# Patient Record
Sex: MALE | Race: WHITE | NOT HISPANIC OR LATINO | Employment: OTHER | URBAN - METROPOLITAN AREA
[De-identification: names, ages, dates, MRNs, and addresses within clinical notes are randomized per-mention and may not be internally consistent; named-entity substitution may affect disease eponyms.]

---

## 2023-01-10 ENCOUNTER — HOSPITAL ENCOUNTER (EMERGENCY)
Facility: HOSPITAL | Age: 28
Discharge: HOME | End: 2023-01-11
Attending: EMERGENCY MEDICINE
Payer: MEDICAID

## 2023-01-10 DIAGNOSIS — M25.552 CHRONIC LEFT HIP PAIN: Primary | ICD-10-CM

## 2023-01-10 DIAGNOSIS — M25.559 HIP PAIN: ICD-10-CM

## 2023-01-10 DIAGNOSIS — G89.29 CHRONIC LEFT HIP PAIN: Primary | ICD-10-CM

## 2023-01-10 DIAGNOSIS — M25.569 KNEE PAIN: ICD-10-CM

## 2023-01-10 PROCEDURE — 99284 EMERGENCY DEPT VISIT MOD MDM: CPT | Mod: ,,, | Performed by: EMERGENCY MEDICINE

## 2023-01-10 PROCEDURE — 99284 PR EMERGENCY DEPT VISIT,LEVEL IV: ICD-10-PCS | Mod: ,,, | Performed by: EMERGENCY MEDICINE

## 2023-01-10 PROCEDURE — 99284 EMERGENCY DEPT VISIT MOD MDM: CPT | Mod: 25

## 2023-01-11 VITALS
RESPIRATION RATE: 20 BRPM | HEART RATE: 84 BPM | OXYGEN SATURATION: 99 % | SYSTOLIC BLOOD PRESSURE: 102 MMHG | TEMPERATURE: 98 F | DIASTOLIC BLOOD PRESSURE: 52 MMHG

## 2023-01-11 NOTE — ED PROVIDER NOTES
"Source of History:  Patient  Chart    Chief complaint:  Hip Pain (L hip pain. Thinks his hip is dislocated. Reports "knee is not working as well." ROM decreased. N/V intact. )      HPI:  Robyn Clifford is a 27 y.o. male with history of neurologic Behcet's syndrome presenting to emergency department with complaint of left hip pain.    Per chart review, in 2020, rheumatology note states that patient has symptoms include urinary incontinence, bowel incontinence, sexual dysfunction, leg numbness, headache, left upper extremity weakness, lower extremity weakness, and back and hip pain.    Patient was evaluated in a bed.  He was laying on the bed, turned away from me, and would not sit up, open his eyes to engage in conversation.  He states that he has had several months of left hip pain.  He denies injury.  He thinks it is dislocated.  He will not give any other history.    Review of patient's allergies indicates:  No Known Allergies    No current facility-administered medications on file prior to encounter.     No current outpatient medications on file prior to encounter.       PMH:  As per HPI and below:  History reviewed. No pertinent past medical history.  History reviewed. No pertinent surgical history.    Social History     Socioeconomic History    Marital status: Single   Tobacco Use    Smoking status: Unknown       History reviewed. No pertinent family history.    Physical Exam:      Vitals:    01/11/23 0428   BP: (!) 102/52   Pulse: 84   Resp: 20   Temp: 98 °F (36.7 °C)     Gen: No acute distress. Nontoxic.  Thin, chronically ill-appearing.  Mental Status:  Alert and oriented x 3.  Appropriate, conversant.  Skin: Warm, dry. No rashes seen.   Pulm: No increased work of breathing.  CV: Normal peripheral perfusion.  Left lower extremity warm and well perfused  MSK:  I am able to passively range the left hip and knee.  No focal tenderness.  No deformity.  No crepitus.  Compartments are soft.  There is muscle wasting " and contracture present.  Neuro:  Somnolent but arousable.    Laboratory Studies:  Labs Reviewed - No data to display    X-rays (independently interpreted by me):  No fracture or dislocation    Chart reviewed.     Imaging Results              X-Ray Femur AP/LAT Left (Final result)  Result time 01/11/23 03:28:35      Final result by Ishmael Santana MD (01/11/23 03:28:35)                   Impression:      No acute fracture or dislocation identified noting limited assessment of the left knee due to suboptimal positioning.      Electronically signed by: Ishmael Santana MD  Date:    01/11/2023  Time:    03:28               Narrative:    EXAMINATION:  XR HIP WITH PELVIS WHEN PERFORMED, 2 OR 3 VIEWS LEFT; XR FEMUR 2 VIEW LEFT; XR KNEE 1 OR 2 VIEW LEFT    CLINICAL HISTORY:  knee pain ; Pain in unspecified hip; Pain in unspecified knee    TECHNIQUE:  AP view of the pelvis and frog leg lateral view of the left hip were performed.  Left femur two views and left knee three views were obtained.  Views of the left knee were obtained with the knee in flexion.    COMPARISON:  None    FINDINGS:  No fractures or dislocations.  Unremarkable visualized bony structures. Limited assessment of the left knee with views obtained in flexion.  Tibial plateau not well demonstrated.  No displaced fracture seen.                                       X-Ray Knee 1 or 2 View Left (Final result)  Result time 01/11/23 03:28:35   Procedure changed from X-Ray Knee 3 View Left     Final result by Ishmael Santana MD (01/11/23 03:28:35)                   Impression:      No acute fracture or dislocation identified noting limited assessment of the left knee due to suboptimal positioning.      Electronically signed by: Ishmael Santana MD  Date:    01/11/2023  Time:    03:28               Narrative:    EXAMINATION:  XR HIP WITH PELVIS WHEN PERFORMED, 2 OR 3 VIEWS LEFT; XR FEMUR 2 VIEW LEFT; XR KNEE 1 OR 2 VIEW LEFT    CLINICAL HISTORY:  knee pain ;  Pain in unspecified hip; Pain in unspecified knee    TECHNIQUE:  AP view of the pelvis and frog leg lateral view of the left hip were performed.  Left femur two views and left knee three views were obtained.  Views of the left knee were obtained with the knee in flexion.    COMPARISON:  None    FINDINGS:  No fractures or dislocations.  Unremarkable visualized bony structures. Limited assessment of the left knee with views obtained in flexion.  Tibial plateau not well demonstrated.  No displaced fracture seen.                                       X-Ray Hip 2 or 3 views Left (with Pelvis when performed) (Final result)  Result time 01/11/23 03:28:35      Final result by Ishmael Santana MD (01/11/23 03:28:35)                   Impression:      No acute fracture or dislocation identified noting limited assessment of the left knee due to suboptimal positioning.      Electronically signed by: Ishmael Santana MD  Date:    01/11/2023  Time:    03:28               Narrative:    EXAMINATION:  XR HIP WITH PELVIS WHEN PERFORMED, 2 OR 3 VIEWS LEFT; XR FEMUR 2 VIEW LEFT; XR KNEE 1 OR 2 VIEW LEFT    CLINICAL HISTORY:  knee pain ; Pain in unspecified hip; Pain in unspecified knee    TECHNIQUE:  AP view of the pelvis and frog leg lateral view of the left hip were performed.  Left femur two views and left knee three views were obtained.  Views of the left knee were obtained with the knee in flexion.    COMPARISON:  None    FINDINGS:  No fractures or dislocations.  Unremarkable visualized bony structures. Limited assessment of the left knee with views obtained in flexion.  Tibial plateau not well demonstrated.  No displaced fracture seen.                                      Medications Given:  Medications - No data to display    MDM:    27 y.o. male with complaint of chronic left hip pain.  Patient states he was concerned it was dislocated, although there has been no recent injury and he can range it.  Patient is laying on the  clarence during my assessment, will not turn to face me, will not open his eyes to engage in conversation.      X-rays are negative.  No emergent medical condition identified.  Discharged home.    Diagnostic Impression:    1. Chronic left hip pain    2. Knee pain    3. Hip pain         ED Disposition Condition    Discharge Stable          ED Prescriptions    None       Follow-up Information       Follow up With Specialties Details Why Contact Info    Your primary care doctor  Schedule an appointment as soon as possible for a visit               Dian Schwartz MD  Emergency Medicine         Dian Schwartz MD  01/15/23 0244

## 2023-01-11 NOTE — DISCHARGE INSTRUCTIONS
You can take Tylenol or ibuprofen for your pain.  Follow-up with your doctor.  X-rays did not show broken bones or a dislocation.    Tests today showed:   Labs Reviewed - No data to display  X-Ray Femur AP/LAT Left   Final Result      No acute fracture or dislocation identified noting limited assessment of the left knee due to suboptimal positioning.         Electronically signed by: Ishmael Santana MD   Date:    01/11/2023   Time:    03:28      X-Ray Knee 1 or 2 View Left   Final Result      No acute fracture or dislocation identified noting limited assessment of the left knee due to suboptimal positioning.         Electronically signed by: Ishmael Santana MD   Date:    01/11/2023   Time:    03:28      X-Ray Hip 2 or 3 views Left (with Pelvis when performed)   Final Result      No acute fracture or dislocation identified noting limited assessment of the left knee due to suboptimal positioning.         Electronically signed by: Ishmael Santana MD   Date:    01/11/2023   Time:    03:28          Treatments you had today:   Medications - No data to display    Follow-Up Plan:  - Follow-up with primary care doctor within 3 - 5 days  - Additional testing and/or evaluation as directed by your primary doctor    Return to the Emergency Department for symptoms including but not limited to: worsening symptoms, shortness of breath or chest pain, vomiting with inability to hold down fluids, fevers greater than 100.4°F, passing out/fainting/unconsciousness, or other concerning symptoms.

## 2023-01-11 NOTE — ED TRIAGE NOTES
"Robyn Clifford, a 27 y.o. male presents to the ED w/ complaint of hip pain. L hip pain, thinks he dislocated it. Reports knee "isnt working that well"    Adult Physical Assessment  LOC: Robyn Clifford, 27 y.o. male verified via two identifiers.  The patient is awake, alert, oriented and speaking appropriately at this time.  APPEARANCE: Patient resting comfortably and appears to be in no acute distress at this time. Patient is clean and well groomed, patient's clothing is properly fastened.  SKIN:The skin is warm and dry, color consistent with ethnicity, patient has normal skin turgor and moist mucus membranes, skin intact, no breakdown or brusing noted.  MUSCULOSKELETAL: Patient moving all extremities well, no obvious swelling or deformities noted.C/o L hip pain, thinks he dislocated it.   RESPIRATORY: Airway is open and patent, respirations are spontaneous, patient has a normal effort and rate, no accessory muscle use noted.  CARDIAC: Patient has a normal rate and rhythm, no periphreal edema noted in any extremity, capillary refill < 3 seconds in all extremities  ABDOMEN: Soft and non tender to palpation, no abdominal distention noted. Bowel sounds present in all four quadrants.  NEUROLOGIC: Eyes open spontaneously, behavior appropriate to situation, follows commands, facial expression symmetrical, bilateral hand grasp equal and even, purposeful motor response noted, normal sensation in all extremities when touched with a finger.      Triage note:  Chief Complaint   Patient presents with    Hip Pain     L hip pain. Thinks his hip is dislocated. Reports "knee is not working as well." ROM decreased. N/V intact.      Review of patient's allergies indicates:  No Known Allergies  No past medical history on file.    "

## 2023-02-06 ENCOUNTER — OFFICE VISIT (OUTPATIENT)
Dept: PRIMARY CARE CLINIC | Facility: CLINIC | Age: 28
End: 2023-02-06
Payer: MEDICAID

## 2023-02-06 VITALS
BODY MASS INDEX: 19.02 KG/M2 | WEIGHT: 140.44 LBS | SYSTOLIC BLOOD PRESSURE: 110 MMHG | HEART RATE: 123 BPM | HEIGHT: 72 IN | OXYGEN SATURATION: 99 % | DIASTOLIC BLOOD PRESSURE: 71 MMHG

## 2023-02-06 DIAGNOSIS — M25.50 ARTHRALGIA, UNSPECIFIED JOINT: ICD-10-CM

## 2023-02-06 DIAGNOSIS — M25.559 PAIN IN UNSPECIFIED HIP: ICD-10-CM

## 2023-02-06 DIAGNOSIS — Z00.00 ANNUAL PHYSICAL EXAM: ICD-10-CM

## 2023-02-06 DIAGNOSIS — M25.552 LEFT HIP PAIN: Primary | ICD-10-CM

## 2023-02-06 DIAGNOSIS — M35.2 BEHCET'S DISEASE: ICD-10-CM

## 2023-02-06 PROBLEM — N52.9 ED (ERECTILE DYSFUNCTION) OF ORGANIC ORIGIN: Status: ACTIVE | Noted: 2023-02-06
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
## 2023-02-06 PROBLEM — R30.0 DIFFICULT OR PAINFUL URINATION: Status: ACTIVE | Noted: 2023-02-06

## 2023-02-06 PROCEDURE — 1159F PR MEDICATION LIST DOCUMENTED IN MEDICAL RECORD: ICD-10-PCS | Mod: CPTII,,, | Performed by: FAMILY MEDICINE

## 2023-02-06 PROCEDURE — 3008F BODY MASS INDEX DOCD: CPT | Mod: CPTII,,, | Performed by: FAMILY MEDICINE

## 2023-02-06 PROCEDURE — 99999 PR PBB SHADOW E&M-EST. PATIENT-LVL V: ICD-10-PCS | Mod: PBBFAC,,, | Performed by: FAMILY MEDICINE

## 2023-02-06 PROCEDURE — 3078F PR MOST RECENT DIASTOLIC BLOOD PRESSURE < 80 MM HG: ICD-10-PCS | Mod: CPTII,,, | Performed by: FAMILY MEDICINE

## 2023-02-06 PROCEDURE — 1160F RVW MEDS BY RX/DR IN RCRD: CPT | Mod: CPTII,,, | Performed by: FAMILY MEDICINE

## 2023-02-06 PROCEDURE — 99204 OFFICE O/P NEW MOD 45 MIN: CPT | Mod: S$PBB,,, | Performed by: FAMILY MEDICINE

## 2023-02-06 PROCEDURE — 1160F PR REVIEW ALL MEDS BY PRESCRIBER/CLIN PHARMACIST DOCUMENTED: ICD-10-PCS | Mod: CPTII,,, | Performed by: FAMILY MEDICINE

## 2023-02-06 PROCEDURE — 3008F PR BODY MASS INDEX (BMI) DOCUMENTED: ICD-10-PCS | Mod: CPTII,,, | Performed by: FAMILY MEDICINE

## 2023-02-06 PROCEDURE — 3074F PR MOST RECENT SYSTOLIC BLOOD PRESSURE < 130 MM HG: ICD-10-PCS | Mod: CPTII,,, | Performed by: FAMILY MEDICINE

## 2023-02-06 PROCEDURE — 3074F SYST BP LT 130 MM HG: CPT | Mod: CPTII,,, | Performed by: FAMILY MEDICINE

## 2023-02-06 PROCEDURE — 99204 PR OFFICE/OUTPT VISIT, NEW, LEVL IV, 45-59 MIN: ICD-10-PCS | Mod: S$PBB,,, | Performed by: FAMILY MEDICINE

## 2023-02-06 PROCEDURE — 3078F DIAST BP <80 MM HG: CPT | Mod: CPTII,,, | Performed by: FAMILY MEDICINE

## 2023-02-06 PROCEDURE — 1159F MED LIST DOCD IN RCRD: CPT | Mod: CPTII,,, | Performed by: FAMILY MEDICINE

## 2023-02-06 PROCEDURE — 99999 PR PBB SHADOW E&M-EST. PATIENT-LVL V: CPT | Mod: PBBFAC,,, | Performed by: FAMILY MEDICINE

## 2023-02-06 PROCEDURE — 99215 OFFICE O/P EST HI 40 MIN: CPT | Mod: PBBFAC,PN | Performed by: FAMILY MEDICINE

## 2023-02-06 RX ORDER — AMITRIPTYLINE HYDROCHLORIDE 75 MG/1
75 TABLET ORAL NIGHTLY
COMMUNITY
Start: 2022-08-22

## 2023-02-06 RX ORDER — SPIRONOLACTONE 50 MG/1
TABLET, FILM COATED ORAL
COMMUNITY
Start: 2022-12-21

## 2023-02-06 RX ORDER — ESTRADIOL 1 MG/1
TABLET ORAL
COMMUNITY
Start: 2023-01-20

## 2023-02-06 RX ORDER — CYCLOBENZAPRINE HCL 10 MG
10 TABLET ORAL 3 TIMES DAILY PRN
Qty: 40 TABLET | Refills: 3 | Status: SHIPPED | OUTPATIENT
Start: 2023-02-06

## 2023-02-06 RX ORDER — DIVALPROEX SODIUM 500 MG/1
500 TABLET, FILM COATED, EXTENDED RELEASE ORAL NIGHTLY
COMMUNITY
Start: 2022-08-18 | End: 2023-02-06

## 2023-02-06 RX ORDER — TAMSULOSIN HYDROCHLORIDE 0.4 MG/1
1 CAPSULE ORAL DAILY
COMMUNITY

## 2023-02-06 RX ORDER — DIVALPROEX SODIUM 250 MG/1
1 TABLET, FILM COATED, EXTENDED RELEASE ORAL
COMMUNITY
End: 2023-02-06

## 2023-02-06 RX ORDER — AZATHIOPRINE 50 MG/1
150 TABLET ORAL
COMMUNITY
Start: 2022-08-31 | End: 2023-02-06

## 2023-02-06 RX ORDER — SPIRONOLACTONE 25 MG/1
TABLET ORAL
COMMUNITY
Start: 2022-08-31 | End: 2023-02-06

## 2023-02-06 NOTE — PROGRESS NOTES
Clinic Note  2/6/2023      Subjective:       Patient ID:  Marci is a 27 y.o. male being seen for an new visit.      Chief Complaint: Establish Care and Hip Pain    Establish care-patient with a history of Behcet's disease, remote history of depression and suicide attempt over 10 years ago, on transgender on hormone replacement here to establish care and left hip pain     Left hip pain-in 2022, patient woke up with sudden left hip pain.  Feels like stabbing the patient in the bones .  Feels like his left hip is cracking and popping all the time.  Went to the emergency room and had negative x-rays.  Since then, has been unable to walk properly, sitting in a wheelchair.  Sometimes when patient is on a bicycle and movement helps the pain some.  Due to history of Behcet's, has been on prednisone for years.  Has not seen a rheumatologist in several years    Chronic joint pain-patient reports having chronic joint pain.  NSAIDs such as naproxen and diclofenac does not really help.  Flexeril does help, was previously also taking with orphenadrine and temazepam.    Depression/pain/sleep/headaches - on amitriptyline for this     History of migraines-was previously on Depakote for this     Transgender-currently on spironolactone and estradiol hormone daily.  [facility]        History reviewed. No pertinent family history.  Social History     Socioeconomic History    Marital status: Single   Tobacco Use    Smoking status: Unknown     History reviewed. No pertinent surgical history.  Medication List with Changes/Refills   New Medications    CYCLOBENZAPRINE (FLEXERIL) 10 MG TABLET    Take 1 tablet (10 mg total) by mouth 3 (three) times daily as needed for Muscle spasms.   Current Medications    AMITRIPTYLINE (ELAVIL) 75 MG TABLET    Take 75 mg by mouth every evening.    ESTRADIOL (ESTRACE) 1 MG TABLET    Take by mouth.    SPIRONOLACTONE (ALDACTONE) 50 MG TABLET        TAMSULOSIN (FLOMAX)  0.4 MG CAP    Take 1 capsule by mouth once daily.   Discontinued Medications    AZATHIOPRINE (IMURAN) 50 MG TAB    Take 150 mg by mouth.    DIVALPROEX 500 MG TB24    Take 500 mg by mouth every evening.    DIVALPROEX ER (DEPAKOTE ER) 250 MG 24 HR TABLET    1 tablet.    SPIRONOLACTONE (ALDACTONE) 25 MG TABLET         Patient Active Problem List   Diagnosis    Behcet's disease    Fall    ED (erectile dysfunction) of organic origin    Difficult or painful urination     Review of Systems   Constitutional:  Negative for chills, fever, malaise/fatigue and weight loss.   HENT:  Negative for congestion, sinus pain and sore throat.    Respiratory:  Negative for cough, shortness of breath and wheezing.    Cardiovascular:  Negative for chest pain and palpitations.   Gastrointestinal:  Negative for constipation, diarrhea, nausea and vomiting.   Genitourinary:  Negative for dysuria, frequency and urgency.   Musculoskeletal:  Positive for joint pain. Negative for back pain, falls, myalgias and neck pain.   Skin:  Negative for rash.   Neurological:  Negative for headaches.       Objective:      /71 (BP Location: Left arm, Patient Position: Sitting, BP Method: X-Large (Automatic))   Pulse (!) 123   Ht 6' (1.829 m)   Wt 63.7 kg (140 lb 6.9 oz)   SpO2 99%   BMI 19.05 kg/m²   Estimated body mass index is 19.05 kg/m² as calculated from the following:    Height as of this encounter: 6' (1.829 m).    Weight as of this encounter: 63.7 kg (140 lb 6.9 oz).  Physical Exam  Vitals reviewed.   Constitutional:       General: He is not in acute distress.     Appearance: He is not diaphoretic.      Comments: Sitting in wheelchair   HENT:      Head: Normocephalic and atraumatic.   Eyes:      Conjunctiva/sclera: Conjunctivae normal.   Cardiovascular:      Rate and Rhythm: Normal rate and regular rhythm.      Heart sounds: Normal heart sounds.   Pulmonary:      Effort: Pulmonary effort is normal. No respiratory distress.      Breath  sounds: Normal breath sounds. No wheezing.   Abdominal:      General: Bowel sounds are normal.      Palpations: Abdomen is soft.   Musculoskeletal:         General: Normal range of motion.      Cervical back: Normal range of motion.      Right hip: Normal.        Legs:    Skin:     General: Skin is warm and dry.      Findings: No erythema or rash.   Neurological:      Mental Status: He is alert and oriented to person, place, and time.   Psychiatric:         Mood and Affect: Mood and affect normal.         Behavior: Behavior normal.         Thought Content: Thought content normal.         Judgment: Judgment normal.         Assessment and Plan:     1. Left hip pain /  Pain in unspecified hip  - patient with acute left hip pain and history of being on prednisone for years, x-rays in the ER were unremarkable.  Refer to Orthopedics, Physical therapy, though patient preferring physical therapy to come to his house due to transportation issues, will obtain CT scan for further evaluation of the hip.  Will also refer to physical therapy for wheelchair evaluation.  Will give Flexeril p.r.n. for pain  - Ambulatory referral/consult to Physical/Occupational Therapy; Future  - Ambulatory referral/consult to Orthopedics; Future  - CT Hip Without Contrast Left; Future  - Ambulatory referral/consult to Home Health; Future    3. Behcet's disease  - Ambulatory referral/consult to Rheumatology; Future      4. Arthralgia, unspecified joint  - cyclobenzaprine (FLEXERIL) 10 MG tablet; Take 1 tablet (10 mg total) by mouth 3 (three) times daily as needed for Muscle spasms.  Dispense: 40 tablet; Refill: 3    5. Annual physical exam  - CBC Auto Differential; Future  - Comprehensive Metabolic Panel; Future  - Lipid Panel; Future  - TSH; Future  - Vitamin D; Future  - C-reactive protein; Future  - Sedimentation rate; Future        Follow up:   No follow-ups on file.     Other Orders Placed This Visit:  Orders Placed This Encounter   Procedures     CT Hip Without Contrast Left     Standing Status:   Future     Standing Expiration Date:   2/6/2024     Order Specific Question:   Oral/Rectal Contrast instructions:     Answer:   NO Oral Contrast     Order Specific Question:   May the Radiologist modify the order per protocol to meet the clinical needs of the patient?     Answer:   Yes    CBC Auto Differential     Standing Status:   Future     Standing Expiration Date:   2/6/2024    Comprehensive Metabolic Panel     Standing Status:   Future     Standing Expiration Date:   2/6/2024    Lipid Panel     Standing Status:   Future     Standing Expiration Date:   2/6/2024    TSH     Standing Status:   Future     Standing Expiration Date:   2/6/2024    Vitamin D     Standing Status:   Future     Standing Expiration Date:   4/6/2024    C-reactive protein     Standing Status:   Future     Standing Expiration Date:   4/6/2024    Sedimentation rate     Standing Status:   Future     Standing Expiration Date:   4/6/2024    Ambulatory referral/consult to Physical/Occupational Therapy     Standing Status:   Future     Standing Expiration Date:   3/6/2024     Referral Priority:   Routine     Referral Type:   Physical Medicine     Referral Reason:   Specialty Services Required     Number of Visits Requested:   1    Ambulatory referral/consult to Orthopedics     Standing Status:   Future     Standing Expiration Date:   3/6/2024     Referral Priority:   Routine     Referral Type:   Consultation     Requested Specialty:   Orthopedic Surgery     Number of Visits Requested:   1    Ambulatory referral/consult to Home Health     Standing Status:   Future     Standing Expiration Date:   3/6/2024     Referral Priority:   Routine     Referral Type:   Home Health     Referral Reason:   Specialty Services Required     Requested Specialty:   Home Health Services     Number of Visits Requested:   1    Ambulatory referral/consult to Rheumatology     Standing Status:   Future     Standing  Expiration Date:   3/6/2024     Referral Priority:   Routine     Referral Type:   Consultation     Referral Reason:   Specialty Services Required     Requested Specialty:   Rheumatology     Number of Visits Requested:   1           Pete Vogt MD        This note is dictated on M*Modal word recognition program.  There are word recognition mistakes that are occasionally missed on review.

## 2023-02-12 ENCOUNTER — PATIENT MESSAGE (OUTPATIENT)
Dept: PRIMARY CARE CLINIC | Facility: CLINIC | Age: 28
End: 2023-02-12
Payer: MEDICAID

## 2023-02-15 ENCOUNTER — PATIENT MESSAGE (OUTPATIENT)
Dept: PRIMARY CARE CLINIC | Facility: CLINIC | Age: 28
End: 2023-02-15
Payer: MEDICAID

## 2023-02-15 DIAGNOSIS — M25.552 LEFT HIP PAIN: Primary | ICD-10-CM

## 2023-02-15 DIAGNOSIS — Z60.9 POOR SOCIAL SITUATION: ICD-10-CM

## 2023-02-15 SDOH — SOCIAL DETERMINANTS OF HEALTH (SDOH): PROBLEM RELATED TO SOCIAL ENVIRONMENT, UNSPECIFIED: Z60.9

## 2023-02-16 ENCOUNTER — CLINICAL SUPPORT (OUTPATIENT)
Dept: REHABILITATION | Facility: HOSPITAL | Age: 28
End: 2023-02-16
Payer: MEDICAID

## 2023-02-16 ENCOUNTER — HOSPITAL ENCOUNTER (OUTPATIENT)
Dept: RADIOLOGY | Facility: HOSPITAL | Age: 28
Discharge: HOME | End: 2023-02-16
Attending: FAMILY MEDICINE
Payer: MEDICAID

## 2023-02-16 DIAGNOSIS — M25.662 DECREASED RANGE OF MOTION OF BOTH LOWER EXTREMITIES: ICD-10-CM

## 2023-02-16 DIAGNOSIS — Z74.09 DECREASED STRENGTH, ENDURANCE, AND MOBILITY: ICD-10-CM

## 2023-02-16 DIAGNOSIS — R53.1 DECREASED STRENGTH, ENDURANCE, AND MOBILITY: ICD-10-CM

## 2023-02-16 DIAGNOSIS — R68.89 DECREASED STRENGTH, ENDURANCE, AND MOBILITY: ICD-10-CM

## 2023-02-16 DIAGNOSIS — M25.552 LEFT HIP PAIN: ICD-10-CM

## 2023-02-16 DIAGNOSIS — M25.559 PAIN IN UNSPECIFIED HIP: ICD-10-CM

## 2023-02-16 DIAGNOSIS — M25.661 DECREASED RANGE OF MOTION OF BOTH LOWER EXTREMITIES: ICD-10-CM

## 2023-02-16 DIAGNOSIS — R26.2 DIFFICULTY WALKING: ICD-10-CM

## 2023-02-16 PROCEDURE — 73700 CT HIP WITHOUT CONTRAST LEFT: ICD-10-PCS | Mod: 26,LT,, | Performed by: INTERNAL MEDICINE

## 2023-02-16 PROCEDURE — 73700 CT LOWER EXTREMITY W/O DYE: CPT | Mod: 26,LT,, | Performed by: INTERNAL MEDICINE

## 2023-02-16 PROCEDURE — 73700 CT LOWER EXTREMITY W/O DYE: CPT | Mod: TC,LT

## 2023-02-16 PROCEDURE — 97162 PT EVAL MOD COMPLEX 30 MIN: CPT | Mod: PN

## 2023-02-16 PROCEDURE — 97110 THERAPEUTIC EXERCISES: CPT | Mod: PN

## 2023-02-17 PROBLEM — M25.662 DECREASED RANGE OF MOTION OF BOTH LOWER EXTREMITIES: Status: ACTIVE | Noted: 2023-02-17

## 2023-02-17 PROBLEM — Z74.09 DECREASED STRENGTH, ENDURANCE, AND MOBILITY: Status: ACTIVE | Noted: 2023-02-17

## 2023-02-17 PROBLEM — R68.89 DECREASED STRENGTH, ENDURANCE, AND MOBILITY: Status: ACTIVE | Noted: 2023-02-17

## 2023-02-17 PROBLEM — R26.2 DIFFICULTY WALKING: Status: ACTIVE | Noted: 2023-02-17

## 2023-02-17 PROBLEM — M25.661 DECREASED RANGE OF MOTION OF BOTH LOWER EXTREMITIES: Status: ACTIVE | Noted: 2023-02-17

## 2023-02-17 PROBLEM — R53.1 DECREASED STRENGTH, ENDURANCE, AND MOBILITY: Status: ACTIVE | Noted: 2023-02-17

## 2023-02-17 NOTE — PLAN OF CARE
[facility]   Physical Therapy Initial Evaluation     Date: 2/16/2023   Name: Robyn Clifford  [facility] Number: [id number]    Therapy Diagnosis:   Encounter Diagnoses   Name Primary?    Left hip pain     Decreased range of motion of both lower extremities     Decreased strength, endurance, and mobility     Difficulty walking      Physician: Pete Vogt MD    Physician Orders: PT Eval and Treat   Medical Diagnosis from Referral: M25.552 (ICD-10-CM) - Left hip pain   Evaluation Date: 2/16/2023  Authorization Period Expiration: 2/6/2024  Plan of Care Expiration: 3/31/2023  Progress Note Due: 3/17/2023  Visit # / Visits authorized: 1/ 1   FOTO: 1/3    Precautions: Standard     Time In: 12:15 pm  Time Out: 1:00 pm  Total Appointment Time (timed & untimed codes): 45 minutes      SUBJECTIVE     Date of onset: December hip pain started, chronic low back     History of current condition - [facility] reports: left hip and low back (lumbar and below) pain. Patient reports back pain has been forever. Patient reports that left hip pain started randomly one night he woke up with it. 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 Patient reports that he is able to walk short distances, also likes to use an exercise bike. Patient reports that his hip pain is constant and when he tries to move it increases his pain. Patient reports ice and heat make it worse, does not know anything that makes it better. Patient denies having any steps that he has to navigate at home. Patient reports that he falls almost every single day due to decreased LOWER EXTREMITY strength and difficulty walking. Patient reports that he has had a lot of scans that show which they can not find anything specific which may be causing this.     Falls: once a day, walking     Imaging, CT scan films: not yet released     Prior Therapy: N/A  Social History:  lives alone  Occupation: does not work   Prior  Level of Function: no pain with daily activities   Current Level of Function: unable to walk for prolonged distances,     Pain:  Current 7/10, worst 10/10, best 7/10   Location: left feels deep hip pain   Description: Aching, Dull, Burning, Throbbing, Cold, and Variable  Aggravating Factors: Sitting, Standing, Laying, Bending, Walking, Night Time, and Getting out of bed/chair  Easing Factors: patient reports nothing relieves pains     Patients goals: decreased pain, be able to walk again      Medical History:   No past medical history on file.    Surgical History:   Robyn Clifford  has no past surgical history on file.    Medications:   Robyn has a current medication list which includes the following prescription(s): amitriptyline, cyclobenzaprine, estradiol, spironolactone, and tamsulosin.    Allergies:   Review of patient's allergies indicates:  No Known Allergies       OBJECTIVE     Observation: wheelchair dependent, independent     Posture: significant abnormal posture in wheelchair due to     SIT TO STAND: requires minmal assistance, unable to stand without hand support, significant decreased WEIGHT BEARING (reports increased pain in bilateral legs and lumbar spine with standing posture)     Hip Range of Motion:   Right active Right Passive Left active  Left Passive   Flexion 90 120 90 pain 120 pain   Abduction 30 WNL 30 pain WNL   Extension -10 0 -10 0   Ext. Rotation 10 45 10 pain 45   Int. Rotation 10 45 10 45       Lower Extremity Strength  Right LE  Left LE    Quadriceps: 4/5 Quadriceps: Unable to test due to pain    Hamstrings: 4-/5 Hamstrings: 4-/5   Iliopsoas: 4-/5 Iliopsoas: 4-/5   Hip extension:  3/5 Hip extension: 3/5   PGM: 3/5 PGM: 3/5   Hip ER: 3/5 Hip ER: 3/5   Hip IR: 3/5 Hip IR: 3/5   Glut Max 3/5    Glut Max 3/5      Unable to test LEFT LOWER EXTREMITY MMT and range of motion due to patient report of significant increased pain in left hip, Patient       Functional Tests:  SIT TO STAND:  requires CONTACT GUARD ASSIST, significant difficulty attaining upright posture and full WEIGHT BEARING on LEFT LOWER EXTREMITY, requires HHA during static stance     Special Tests:   Unable to test due to reports of pain attaining test positioning     Flexibility: significant limitations in BLE flexibility, contractures noted along LEFT LOWER EXTREMITY hamstring and calf musculature         Eliazar Test Right  Left    Iliopsoas NT due to difficulty attaining position  NT due to difficulty attaining position    Rectus Femoris  NT due to difficulty attaining position  NT due to difficulty attaining position        Joint Mobility: unable to test due to patient report of increased pain attaining testing position         Limitation/Restriction for FOTO 1/3 Survey    Therapist reviewed FOTO scores for Robyn Clifford on 2/16/2023.   FOTO documents entered into [employer] - see Media section.    Limitation Score: 70% (limitation score)         TREATMENT     Total Treatment time (time-based codes) separate from Evaluation: 15 minutes      Marci received the treatments listed below:      therapeutic exercises to develop strength, endurance, ROM, flexibility, and posture for 15 minutes including:  Seated hip marches 1 x 10   Seated LONG ARC QUADRICEP 1 x 10   SKTC 30 sec (therapist assist) x 2   Piriformis stretch 30 sec (therapist assist) x 2   Seated hip adduction isometric 5 sec holds x 10   Seated clamshells RTB 2 x 10   SIT TO STAND x3 with assist   Seated FB, side bending, and lateral flexion 1 x 10 all directions       PATIENT EDUCATION AND HOME EXERCISES     Education provided:   - wheelchair positioning, postural awareness, HOME EXERCISE PROGRAM, physical therapy POC    Written Home Exercises Provided: yes. Exercises were reviewed and Marci was able to demonstrate them prior to the end of the session.  Marci demonstrated fair  understanding of the education provided. See EMR under Patient Instructions for exercises  provided during therapy sessions.    ASSESSMENT     Robyn is a 27 y.o. male referred to outpatient Physical Therapy with a medical diagnosis of M25.552 (ICD-10-CM) - Left hip pain. Patient presents with increased low back and hip pain with significant BLE strength, flexibility, range of motion, proprioception, and activity tolerance due to increased sedentary lifestyle limited by w/c for 2 years limiting functional mobility such as performing transfers, ambulation, and self-care tasks safely. Patient has significant PMH of Behcet's syndrome which may be impacting pain levels due to increased inflammation within joints. Patient demonstrates abnormal posture with prolonged sitting due to poor condition of current wheelchair. Patient would benefit from new ASSISTIVE DEVICE to decrease low back and hip pain due to poor sitting posture. Patient demonstrates increased contracture and neurological posturing of LEFT LOWER EXTREMITY, negative for clonus bilateral with no reports of other significant PMH. Patient unable to perform significant testing today due to decreased tolerance to positions with reports of pain inconsistent with musculoskeletal impairments, however, patient will benefit from skilled physical therapy to work on strength, range of motion, and balance to decrease risk for falls and improve function within the home.     Patient prognosis is Fair.   Patient will benefit from skilled outpatient Physical Therapy to address the deficits stated above and in the chart below, provide patient /family education, and to maximize patientt's level of independence.     Plan of care discussed with patient: Yes  Patient's spiritual, cultural and educational needs considered and patient is agreeable to the plan of care and goals as stated below:     Anticipated Barriers for therapy: transportation     Medical Necessity is demonstrated by the following  History  Co-morbidities and personal factors that may impact the plan  of care Co-morbidities:   excessive commute time/distance, financial considerations, level of undertstanding of current condition, and Behcet's disease    Personal Factors:   age  education level  coping style  social background  lifestyle  attitudes     high   Examination  Body Structures and Functions, activity limitations and participation restrictions that may impact the plan of care Body Regions:   back  lower extremities  trunk    Body Systems:    gross symmetry  ROM  strength  gross coordinated movement  balance  gait  transfers  transitions  motor control    Participation Restrictions:   Unable to perform community     Activity limitations:   Learning and applying knowledge  listening    General Tasks and Commands  undertaking multiple tasks    Communication  no deficits    Mobility  lifting and carrying objects  walking  moving around using equipment (WC)  using transportation (bus, train, plane, car)  driving (bike, car, motorcycle)    Self care  dressing  looking after one's health    Domestic Life  shopping  cooking  doing house work (cleaning house, washing dishes, laundry)    Interactions/Relationships  basic interpersonal interactions  complex interpersonal interactions  formal relationships  family relationships  intimate relationships    Life Areas  employment    Community and Social Life  community life  recreation and leisure  human rights         high   Clinical Presentation unstable clinical presentation with unpredictable characteristics high   Decision Making/ Complexity Score: high     Short Term Goals (4 Weeks):   1. Pt will improve SIT TO STAND to independent with use of hands from standard chair and w/c for improved transitional movements and decrease risk for falls  2. Pt will improve impaired LE MMTs by 1/2 grade  to improve strength for functional tasks  3. Pt improve impaired hip ROM by 15 deg AROM in all planes to improve mobility for normal movement patterns.   Long Term Goals (6  Weeks):  1. Pt will improve FOTO score to </= 50% limited to decrease perceived limitation with mobility  2. Pt will ambulate with least restrictive ASSISTIVE DEVICE 75 feet with STAND BY ASSISTANCE for navigation within home safely  3. Pt will improve impaired LE MMTs by 1 grade to improve strength for functional tasks.  4. Pt will be compliant with HEP to supplement PT in restoring pain free function.      PLAN   Plan of care Certification: 2/16/2023 to 3/31/2023.    Outpatient Physical Therapy 2 times weekly for 6 weeks to include the following interventions: Gait Training, Manual Therapy, Neuromuscular Re-ed, Patient Education, Self Care, Therapeutic Activities, and Therapeutic Exercise.     Alejandrina Caballero, PT      I CERTIFY THE NEED FOR THESE SERVICES FURNISHED UNDER THIS PLAN OF TREATMENT AND WHILE UNDER MY CARE   Physician's comments:     Physician's Signature: ___________________________________________________

## 2023-02-22 ENCOUNTER — PATIENT MESSAGE (OUTPATIENT)
Dept: PRIMARY CARE CLINIC | Facility: CLINIC | Age: 28
End: 2023-02-22
Payer: MEDICAID

## 2023-02-28 ENCOUNTER — CLINICAL SUPPORT (OUTPATIENT)
Dept: REHABILITATION | Facility: HOSPITAL | Age: 28
End: 2023-02-28
Payer: MEDICAID

## 2023-02-28 DIAGNOSIS — Z74.09 DECREASED STRENGTH, ENDURANCE, AND MOBILITY: ICD-10-CM

## 2023-02-28 DIAGNOSIS — M25.662 DECREASED RANGE OF MOTION OF BOTH LOWER EXTREMITIES: Primary | ICD-10-CM

## 2023-02-28 DIAGNOSIS — R53.1 DECREASED STRENGTH, ENDURANCE, AND MOBILITY: ICD-10-CM

## 2023-02-28 DIAGNOSIS — R26.2 DIFFICULTY WALKING: ICD-10-CM

## 2023-02-28 DIAGNOSIS — M25.661 DECREASED RANGE OF MOTION OF BOTH LOWER EXTREMITIES: Primary | ICD-10-CM

## 2023-02-28 DIAGNOSIS — R68.89 DECREASED STRENGTH, ENDURANCE, AND MOBILITY: ICD-10-CM

## 2023-02-28 PROCEDURE — 97112 NEUROMUSCULAR REEDUCATION: CPT | Mod: PN,CQ

## 2023-02-28 PROCEDURE — 97110 THERAPEUTIC EXERCISES: CPT | Mod: PN,CQ

## 2023-02-28 NOTE — PROGRESS NOTES
[facility]   Physical Therapy Treatment Note     Name: Robyn Clifford  Clinic Number: [id number]    Therapy Diagnosis:   Encounter Diagnoses   Name Primary?    Decreased range of motion of both lower extremities Yes    Decreased strength, endurance, and mobility     Difficulty walking      Physician: Pete Vogt MD    Visit Date: 2/28/2023    Physician Orders: PT Eval and Treat   Medical Diagnosis from Referral: M25.552 (ICD-10-CM) - Left hip pain   Evaluation Date: 2/16/2023  Authorization Period Expiration: 2/6/2024  Plan of Care Expiration: 3/31/2023  Progress Note Due: 3/17/2023  Visit # / Visits authorized: 1/ 1   FOTO: 1/3     Precautions: Standard     PTA Visit #: 1/5     Time In: 1300  Time Out: 1400  Total Billable Time: 55 minutes    SUBJECTIVE     Pt reports: that he has been having decreased symptoms between treatment seesions.  Aston reports that he was a 10/10 and is now a 2/10    He was compliant with home exercise program.  Response to previous treatment: no adverse reactions  Functional change: improved acitivities of daily living     Pain: 2/10  Location: bilateral back , feet , lower legs, and upper legs     OBJECTIVE     Objective Measures updated at progress report unless specified.     Treatment     Marci(She, her, them) received the treatments listed below:      therapeutic exercises to develop strength, endurance, posture, and core stabilization for 45 minutes including:    Seated upright posture 1min 4x  Recipricol LAQ 2-3sec 3min  Supine Transabdominal bracing 5sec 15x  Supine Bilateral SKTC 10sec 5x  Seated iso hip abduction 5sec 15x      manual therapy techniques: Joint mobilizations, Soft tissue Mobilization, and Friction Massage were applied to the: low back and LE for 00 minutes, including:      Not completed at today's treatment session      neuromuscular re-education activities to improve: Balance, Coordination, Proprioception, and Posture for 10  minutes. The following activities were included:      NuStep L1 Seat 18 UE/LE 10min    therapeutic activities to improve functional performance for 00  minutes, including:    Not completed at today's treatment session      gait training to improve functional mobility and safety for 00  minutes, including:      Not completed at today's treatment session              Patient Education and Home Exercises     Home Exercises Provided and Patient Education Provided     Education provided:     Not completed at today's treatment session      Written Home Exercises Provided: Patient instructed to cont prior HEP. Exercises were reviewed and Marci(She, her, them) was able to demonstrate them prior to the end of the session.  Marci(She, her, them) demonstrated good  understanding of the education provided. See EMR under Patient Instructions for exercises provided during therapy sessions    ASSESSMENT     Good tolerance to exercise patient able to incorporate new exercise in a pain free range.  Patient did require some verbal cueing for correct positioning and sequencing of exercise.  Patient does require additional time with transfers between exercises.      Marci(She, her, them) Is progressing well towards his goals.   Pt prognosis is Fair.     Pt will continue to benefit from skilled outpatient physical therapy to address the deficits listed in the problem list box on initial evaluation, provide pt/family education and to maximize pt's level of independence in the home and community environment.     Pt's spiritual, cultural and educational needs considered and pt agreeable to plan of care and goals.     Anticipated barriers to physical therapy: TBD    Goals:     1. Pt will improve SIT TO STAND to independent with use of hands from standard chair and w/c for improved transitional movements and decrease risk for falls  2. Pt will improve impaired LE MMTs by 1/2 grade  to improve strength for functional tasks  3. Pt improve  impaired hip ROM by 15 deg AROM in all planes to improve mobility for normal movement patterns.   Long Term Goals (6 Weeks):  1. Pt will improve FOTO score to </= 50% limited to decrease perceived limitation with mobility  2. Pt will ambulate with least restrictive ASSISTIVE DEVICE 75 feet with STAND BY ASSISTANCE for navigation within home safely  3. Pt will improve impaired LE MMTs by 1 grade to improve strength for functional tasks.  4. Pt will be compliant with HEP to supplement PT in restoring pain free function.    PLAN     Progress as tolerated per Plan of Care      Gautam Gomez, PTA

## 2023-03-02 DIAGNOSIS — R17 ELEVATED BILIRUBIN: Primary | ICD-10-CM

## 2023-03-06 ENCOUNTER — PATIENT MESSAGE (OUTPATIENT)
Dept: PRIMARY CARE CLINIC | Facility: CLINIC | Age: 28
End: 2023-03-06
Payer: MEDICAID

## 2023-03-06 ENCOUNTER — PATIENT OUTREACH (OUTPATIENT)
Dept: ADMINISTRATIVE | Facility: OTHER | Age: 28
End: 2023-03-06
Payer: MEDICAID

## 2023-03-06 DIAGNOSIS — M25.50 ARTHRALGIA, UNSPECIFIED JOINT: ICD-10-CM

## 2023-03-06 DIAGNOSIS — R17 ELEVATED BILIRUBIN: Primary | ICD-10-CM

## 2023-03-06 DIAGNOSIS — M25.552 LEFT HIP PAIN: Primary | ICD-10-CM

## 2023-03-06 DIAGNOSIS — M25.559 PAIN IN UNSPECIFIED HIP: ICD-10-CM

## 2023-03-07 ENCOUNTER — CLINICAL SUPPORT (OUTPATIENT)
Dept: REHABILITATION | Facility: HOSPITAL | Age: 28
End: 2023-03-07
Payer: MEDICAID

## 2023-03-07 ENCOUNTER — PATIENT OUTREACH (OUTPATIENT)
Dept: ADMINISTRATIVE | Facility: OTHER | Age: 28
End: 2023-03-07
Payer: MEDICAID

## 2023-03-07 DIAGNOSIS — Z74.09 DECREASED STRENGTH, ENDURANCE, AND MOBILITY: ICD-10-CM

## 2023-03-07 DIAGNOSIS — M25.661 DECREASED RANGE OF MOTION OF BOTH LOWER EXTREMITIES: Primary | ICD-10-CM

## 2023-03-07 DIAGNOSIS — R53.1 DECREASED STRENGTH, ENDURANCE, AND MOBILITY: ICD-10-CM

## 2023-03-07 DIAGNOSIS — R26.2 DIFFICULTY WALKING: ICD-10-CM

## 2023-03-07 DIAGNOSIS — M25.662 DECREASED RANGE OF MOTION OF BOTH LOWER EXTREMITIES: Primary | ICD-10-CM

## 2023-03-07 DIAGNOSIS — R68.89 DECREASED STRENGTH, ENDURANCE, AND MOBILITY: ICD-10-CM

## 2023-03-07 PROCEDURE — 97112 NEUROMUSCULAR REEDUCATION: CPT | Mod: PN

## 2023-03-07 PROCEDURE — 97110 THERAPEUTIC EXERCISES: CPT | Mod: PN

## 2023-03-07 NOTE — PROGRESS NOTES
CHW - Follow Up    This Community Health Worker completed a follow up visit with patient via telephone today.  Pt/Caregiver reported: pt reported he is doing well   Community Health Worker provided: CHW provided pt with resources for affodable living spaces   Follow up required: yes   Follow-up Outreach - Due: 3/13/2023

## 2023-03-07 NOTE — PROGRESS NOTES
CHW - Initial Contact    This Community Health Worker completed the Social Determinant of Health questionnaire with patient via telephone today.    Pt identified barriers of most importance are: pt barriers are fixed income and need of affordable housing    Referrals to community agencies completed with patient/caregiver consent outside of [facility] include: yes  Referrals were put through [facility] - no  Support and Services: CHW provided pt with information to web site with affordable rooms to rent   Other information discussed the patient needs / wants help with: pt wants help with finding affording housing    Follow up required: yes   Follow-up Outreach - Due: 3/13/2023   Initial visit was done 3/7/23

## 2023-03-07 NOTE — PROGRESS NOTES
[facility]   Physical Therapy Treatment Note     Name: Robyn Clifford  Clinic Number: [id number]    Therapy Diagnosis:   Encounter Diagnoses   Name Primary?    Decreased range of motion of both lower extremities Yes    Decreased strength, endurance, and mobility     Difficulty walking        Physician: Pete Vogt MD    Visit Date: 3/7/2023    Physician Orders: PT Eval and Treat   Medical Diagnosis from Referral: M25.552 (ICD-10-CM) - Left hip pain   Evaluation Date: 2/16/2023  Authorization Period Expiration: 2/6/2024  Plan of Care Expiration: 3/31/2023  Progress Note Due: 3/17/2023  Visit # / Visits authorized: 2/40       Precautions: Standard     PTA Visit #: 0/5     Time In: 1:15 pm  Time Out: 2:10 pm  Total Billable Time: 55 minutes    SUBJECTIVE     Pt reports: that he has been having decreased symptoms between treatment seesions.  Aston reports that he was a 10/10 and is now a 2/10    He was compliant with home exercise program.  Response to previous treatment: no adverse reactions  Functional change: improved acitivities of daily living     Pain: 3/10  Location: bilateral back , feet , lower legs, and upper legs     OBJECTIVE     Objective Measures updated at progress report unless specified.     Treatment     Marci(She, her, them) received the treatments listed below:      therapeutic exercises to develop strength, endurance, posture, and core stabilization for 45 minutes including:    LTR 2 x 10   Seated upright posture 1min 4x  Seated hip flexion 2 x 10   Recipricol LAQ 2-3sec 3min  Supine Transabdominal bracing 5sec 15x  TRANSVERSE ABDOMINUS marches 2 x 10   Supine hip adduction isometric 5 sec holds 2 x 10   Supine clamshells GTB 2 x 10   Side lying clamshells 2 x 10   Supine Bilateral SKTC 10sec 5x  Seated rows with GTB 2 x 10  Seated horizontal abduction GTB 2 x 10       manual therapy techniques: Joint mobilizations, Soft tissue Mobilization, and Friction Massage were  applied to the: low back and LE for 00 minutes, including:      Not completed at today's treatment session      neuromuscular re-education activities to improve: Balance, Coordination, Proprioception, and Posture for 10 minutes. The following activities were included:    NuStep L1 Seat 18 UE/LE 10min    therapeutic activities to improve functional performance for 00  minutes, including:    Not completed at today's treatment session      gait training to improve functional mobility and safety for 00  minutes, including:      Not completed at today's treatment session        Patient Education and Home Exercises     Home Exercises Provided and Patient Education Provided     Education provided:     Not completed at today's treatment session      Written Home Exercises Provided: Patient instructed to cont prior HEP. Exercises were reviewed and Marci(She, her, them) was able to demonstrate them prior to the end of the session.  Macri(She, her, them) demonstrated good  understanding of the education provided. See EMR under Patient Instructions for exercises provided during therapy sessions    ASSESSMENT     Good tolerance to exercise patient able to incorporate new exercise in a pain free range. Increased strengthening exercises in sitting and supine positioning, however, required min tactile cues and assistance for BLE coordination (LEFT LOWER EXTREMITY > RIGHT LOWER EXTREMITY) due to tendency to fall into hip abduction throughout supine exercises. Patient demonstrates increased fatigue following session today in BLE and back following upright posture and back strengthening exercises.      Marci(She, her, them) Is progressing well towards his goals.   Pt prognosis is Fair.     Pt will continue to benefit from skilled outpatient physical therapy to address the deficits listed in the problem list box on initial evaluation, provide pt/family education and to maximize pt's level of independence in the home and community  environment.     Pt's spiritual, cultural and educational needs considered and pt agreeable to plan of care and goals.     Anticipated barriers to physical therapy: TBD    Goals:     1. Pt will improve SIT TO STAND to independent with use of hands from standard chair and w/c for improved transitional movements and decrease risk for falls  2. Pt will improve impaired LE MMTs by 1/2 grade  to improve strength for functional tasks  3. Pt improve impaired hip ROM by 15 deg AROM in all planes to improve mobility for normal movement patterns.   Long Term Goals (6 Weeks):  1. Pt will improve FOTO score to </= 50% limited to decrease perceived limitation with mobility  2. Pt will ambulate with least restrictive ASSISTIVE DEVICE 75 feet with STAND BY ASSISTANCE for navigation within home safely  3. Pt will improve impaired LE MMTs by 1 grade to improve strength for functional tasks.  4. Pt will be compliant with HEP to supplement PT in restoring pain free function.    PLAN     Progress as tolerated per Plan of Care      Alejandrina Caballero, PT

## 2023-03-09 ENCOUNTER — CLINICAL SUPPORT (OUTPATIENT)
Dept: REHABILITATION | Facility: HOSPITAL | Age: 28
End: 2023-03-09
Payer: MEDICAID

## 2023-03-09 DIAGNOSIS — M25.661 DECREASED RANGE OF MOTION OF BOTH LOWER EXTREMITIES: Primary | ICD-10-CM

## 2023-03-09 DIAGNOSIS — R68.89 DECREASED STRENGTH, ENDURANCE, AND MOBILITY: ICD-10-CM

## 2023-03-09 DIAGNOSIS — R26.2 DIFFICULTY WALKING: ICD-10-CM

## 2023-03-09 DIAGNOSIS — M25.662 DECREASED RANGE OF MOTION OF BOTH LOWER EXTREMITIES: Primary | ICD-10-CM

## 2023-03-09 DIAGNOSIS — R53.1 DECREASED STRENGTH, ENDURANCE, AND MOBILITY: ICD-10-CM

## 2023-03-09 DIAGNOSIS — Z74.09 DECREASED STRENGTH, ENDURANCE, AND MOBILITY: ICD-10-CM

## 2023-03-09 PROCEDURE — 97110 THERAPEUTIC EXERCISES: CPT | Mod: PN

## 2023-03-09 PROCEDURE — 97112 NEUROMUSCULAR REEDUCATION: CPT | Mod: PN

## 2023-03-09 NOTE — PROGRESS NOTES
[facility]   Physical Therapy Treatment Note     Name: Robyn Clifford  [facility] Number: [id number]    Therapy Diagnosis:   Encounter Diagnoses   Name Primary?    Decreased range of motion of both lower extremities Yes    Decreased strength, endurance, and mobility     Difficulty walking        Physician: Pete Vogt MD    Visit Date: 3/9/2023    Physician Orders: PT Eval and Treat   Medical Diagnosis from Referral: M25.552 (ICD-10-CM) - Left hip pain   Evaluation Date: 2/16/2023  Authorization Period Expiration: 2/6/2024  Plan of Care Expiration: 3/31/2023  Progress Note Due: 3/17/2023  Visit # / Visits authorized: 2/40       Precautions: Standard     PTA Visit #: 0/5     Time In: 1:00 pm  Time Out: 1:55 pm  Total Billable Time: 55 minutes    SUBJECTIVE     Pt reports: that he has been feeling better every day in his hip, however, his back is getting more painful. Patient reports he has been sleeping on a futon that is not comfy and thinks that is what causing his back to have increased pain    He was compliant with home exercise program.  Response to previous treatment: no adverse reactions  Functional change: improved acitivities of daily living     Pain: 5/10  Location: bilateral back , feet , lower legs, and upper legs     OBJECTIVE     Objective Measures updated at progress report unless specified.     Treatment     Marci(She, her, them) received the treatments listed below:      therapeutic exercises to develop strength, endurance, posture, and core stabilization for 45 minutes including:    LTR 2 x 10   Seated upright posture 1min 4x  Seated hip flexion 2 x 10   Bridges 2 x 10   Recipricol LAQ 2-3sec 3min  Supine Transabdominal bracing 5sec 15x  TRANSVERSE ABDOMINUS marches 2 x 10   Supine hip adduction isometric 5 sec holds 2 x 10   Supine clamshells GTB 2 x 10   Side lying clamshells 2 x 10   Supine Bilateral SKTC 10sec 5x  Seated rows with GTB 2 x 10  Seated horizontal abduction  GTB 2 x 10       manual therapy techniques: Joint mobilizations, Soft tissue Mobilization, and Friction Massage were applied to the: low back and LE for 00 minutes, including:      Not completed at today's treatment session      neuromuscular re-education activities to improve: Balance, Coordination, Proprioception, and Posture for 10 minutes. The following activities were included:    NuStep L1 Seat 18 UE/LE 10min    therapeutic activities to improve functional performance for 00  minutes, including:    Not completed at today's treatment session      gait training to improve functional mobility and safety for 00  minutes, including:      Not completed at today's treatment session        Patient Education and Home Exercises     Home Exercises Provided and Patient Education Provided     Education provided:     Not completed at today's treatment session      Written Home Exercises Provided: Patient instructed to cont prior HEP. Exercises were reviewed and Marci(She, her, them) was able to demonstrate them prior to the end of the session.  Marci(She, her, them) demonstrated good  understanding of the education provided. See EMR under Patient Instructions for exercises provided during therapy sessions    ASSESSMENT     Attempted standing strengthening exercises, however, patient unable to clear floor for hip abduction or extension exercises, complaints of back pain in extension during standing. Continued with mat strengthening for BLE motor control, coordination, and core stability. Patient required verbal cues for improved TRANSVERSE ABDOMINUS activation throughout exercises, progressed to bridges for gluteal and anti-extension strengthening exercises with no reports of discomfort as long as POSTERIOR PELVIC TILT maintained throughout movement. Significant decreased pelvic and low back control/stability noted throughout bridging exercise.        Marci(She, her, them) Is progressing well towards her goals.   Pt  prognosis is Fair.     Pt will continue to benefit from skilled outpatient physical therapy to address the deficits listed in the problem list box on initial evaluation, provide pt/family education and to maximize pt's level of independence in the home and community environment.     Pt's spiritual, cultural and educational needs considered and pt agreeable to plan of care and goals.     Anticipated barriers to physical therapy: TBD    Goals:     1. Pt will improve SIT TO STAND to independent with use of hands from standard chair and w/c for improved transitional movements and decrease risk for falls  2. Pt will improve impaired LE MMTs by 1/2 grade  to improve strength for functional tasks  3. Pt improve impaired hip ROM by 15 deg AROM in all planes to improve mobility for normal movement patterns.   Long Term Goals (6 Weeks):  1. Pt will improve FOTO score to </= 50% limited to decrease perceived limitation with mobility  2. Pt will ambulate with least restrictive ASSISTIVE DEVICE 75 feet with STAND BY ASSISTANCE for navigation within home safely  3. Pt will improve impaired LE MMTs by 1 grade to improve strength for functional tasks.  4. Pt will be compliant with HEP to supplement PT in restoring pain free function.    PLAN     Progress as tolerated per Plan of Care      Alejandrina Caballero, PT

## 2023-03-13 ENCOUNTER — PATIENT OUTREACH (OUTPATIENT)
Dept: ADMINISTRATIVE | Facility: OTHER | Age: 28
End: 2023-03-13
Payer: MEDICAID

## 2023-03-13 NOTE — PROGRESS NOTES
CHW - Follow Up    This Community Health Worker completed a follow up visit with patient via telephone today.  Pt/Caregiver reported: pt reported she is doing well but is still in need of resources for an apartment   Community Health Worker provided: CHW provided pt with two phone numbers to apartments within budget, put in a referral through [employer]  for employment, as well as gave address and phone number to [facility]   Follow up required: yes   Follow-up Outreach - Due: 3/20/2023

## 2023-03-14 ENCOUNTER — CLINICAL SUPPORT (OUTPATIENT)
Dept: REHABILITATION | Facility: HOSPITAL | Age: 28
End: 2023-03-14
Payer: MEDICAID

## 2023-03-14 DIAGNOSIS — M25.661 DECREASED RANGE OF MOTION OF BOTH LOWER EXTREMITIES: Primary | ICD-10-CM

## 2023-03-14 DIAGNOSIS — R26.2 DIFFICULTY WALKING: ICD-10-CM

## 2023-03-14 DIAGNOSIS — Z74.09 DECREASED STRENGTH, ENDURANCE, AND MOBILITY: ICD-10-CM

## 2023-03-14 DIAGNOSIS — M25.662 DECREASED RANGE OF MOTION OF BOTH LOWER EXTREMITIES: Primary | ICD-10-CM

## 2023-03-14 DIAGNOSIS — R68.89 DECREASED STRENGTH, ENDURANCE, AND MOBILITY: ICD-10-CM

## 2023-03-14 DIAGNOSIS — R53.1 DECREASED STRENGTH, ENDURANCE, AND MOBILITY: ICD-10-CM

## 2023-03-14 PROCEDURE — 97112 NEUROMUSCULAR REEDUCATION: CPT | Mod: PN,CQ

## 2023-03-14 PROCEDURE — 97110 THERAPEUTIC EXERCISES: CPT | Mod: PN,CQ

## 2023-03-14 NOTE — PROGRESS NOTES
[facility]   Physical Therapy Treatment Note     Name: Robyn Clifford  Clinic Number: [id number]    Therapy Diagnosis:   Encounter Diagnoses   Name Primary?    Decreased range of motion of both lower extremities Yes    Decreased strength, endurance, and mobility     Difficulty walking        Physician: Pete Vogt MD    Visit Date: 3/14/2023    Physician Orders: PT Eval and Treat   Medical Diagnosis from Referral: M25.552 (ICD-10-CM) - Left hip pain   Evaluation Date: 2/16/2023  Authorization Period Expiration: 2/6/2024  Plan of Care Expiration: 3/31/2023  Progress Note Due: 3/17/2023  Visit # / Visits authorized: 2/40       Precautions: Standard     PTA Visit #: 1/5     Time In: 1240  Time Out: 1325  Total Billable Time: 45 minutes    SUBJECTIVE     Pt reports: that he has been completing his HEP and is feeling cheko with decreased symptoms between treatment sessions.    He was compliant with home exercise program.  Response to previous treatment: no adverse reactions  Functional change: improved acitivities of daily living     Pain: 5/10  Location: bilateral back , feet , lower legs, and upper legs     OBJECTIVE     Objective Measures updated at progress report unless specified.     Treatment     Robyn(She, her, them) received the treatments listed below:      therapeutic exercises to develop strength, endurance, posture, and core stabilization for 30 minutes including:    LTR 2min   Seated upright posture 1min 4x  Seated hip flexion 2 x 10   Bridges 2 x 10   Bilateral LAQ 2-3sec 3x10  Supine Transabdominal bracing 5sec 15x  TRANSVERSE ABDOMINUS marches 2min   Supine hip adduction isometric 5 sec holds 2min  Supine clamshells GTB 2 x 10   Side lying clamshells 2 x 10   Supine Bilateral SKTC 10sec 5x  Seated rows with GTB 2 x 10  Seated horizontal abduction GTB 2 x 10       manual therapy techniques: Joint mobilizations, Soft tissue Mobilization, and Friction Massage were applied to  the: low back and LE for 00 minutes, including:      Not completed at today's treatment session      neuromuscular re-education activities to improve: Balance, Coordination, Proprioception, and Posture for 10 minutes. The following activities were included:    NuStep L1 Seat 18 UE/LE 10min    therapeutic activities to improve functional performance for 00  minutes, including:    Not completed at today's treatment session      gait training to improve functional mobility and safety for 00  minutes, including:      Not completed at today's treatment session        Patient Education and Home Exercises     Home Exercises Provided and Patient Education Provided     Education provided:     Not completed at today's treatment session      Written Home Exercises Provided: Patient instructed to cont prior HEP. Exercises were reviewed and Robyn(She, her, them) was able to demonstrate them prior to the end of the session.  Robyn(She, her, them) demonstrated good  understanding of the education provided. See EMR under Patient Instructions for exercises provided during therapy sessions    ASSESSMENT     Good tolerance to exercise.  Patient able to maintaining well current intensity for exercise.  Patient was able to progress well with intensity for NuStep for improved reciprocal gait pattern and upright posture.        Robyn(She, her, them) Is progressing well towards her goals.   Pt prognosis is Fair.     Pt will continue to benefit from skilled outpatient physical therapy to address the deficits listed in the problem list box on initial evaluation, provide pt/family education and to maximize pt's level of independence in the home and community environment.     Pt's spiritual, cultural and educational needs considered and pt agreeable to plan of care and goals.     Anticipated barriers to physical therapy: TBD    Goals:     1. Pt will improve SIT TO STAND to independent with use of hands from standard chair and w/c for  improved transitional movements and decrease risk for falls  2. Pt will improve impaired LE MMTs by 1/2 grade  to improve strength for functional tasks  3. Pt improve impaired hip ROM by 15 deg AROM in all planes to improve mobility for normal movement patterns.   Long Term Goals (6 Weeks):  1. Pt will improve FOTO score to </= 50% limited to decrease perceived limitation with mobility  2. Pt will ambulate with least restrictive ASSISTIVE DEVICE 75 feet with STAND BY ASSISTANCE for navigation within home safely  3. Pt will improve impaired LE MMTs by 1 grade to improve strength for functional tasks.  4. Pt will be compliant with HEP to supplement PT in restoring pain free function.    PLAN     Progress as tolerated per Plan of Care      Gautam Gomez, PTA

## 2023-03-16 ENCOUNTER — CLINICAL SUPPORT (OUTPATIENT)
Dept: REHABILITATION | Facility: HOSPITAL | Age: 28
End: 2023-03-16
Payer: MEDICAID

## 2023-03-16 DIAGNOSIS — R26.2 DIFFICULTY WALKING: ICD-10-CM

## 2023-03-16 DIAGNOSIS — R68.89 DECREASED STRENGTH, ENDURANCE, AND MOBILITY: ICD-10-CM

## 2023-03-16 DIAGNOSIS — M25.662 DECREASED RANGE OF MOTION OF BOTH LOWER EXTREMITIES: Primary | ICD-10-CM

## 2023-03-16 DIAGNOSIS — M25.661 DECREASED RANGE OF MOTION OF BOTH LOWER EXTREMITIES: Primary | ICD-10-CM

## 2023-03-16 DIAGNOSIS — Z74.09 DECREASED STRENGTH, ENDURANCE, AND MOBILITY: ICD-10-CM

## 2023-03-16 DIAGNOSIS — R53.1 DECREASED STRENGTH, ENDURANCE, AND MOBILITY: ICD-10-CM

## 2023-03-16 PROCEDURE — 97112 NEUROMUSCULAR REEDUCATION: CPT | Mod: PN,CQ

## 2023-03-16 PROCEDURE — 97110 THERAPEUTIC EXERCISES: CPT | Mod: PN,CQ

## 2023-03-16 NOTE — PROGRESS NOTES
[facility]   Physical Therapy Treatment Note     Name: Robyn Clifford  Clinic Number: [id number]    Therapy Diagnosis:   Encounter Diagnoses   Name Primary?    Decreased range of motion of both lower extremities Yes    Decreased strength, endurance, and mobility     Difficulty walking        Physician: Pete Vogt MD    Visit Date: 3/16/2023    Physician Orders: PT Eval and Treat   Medical Diagnosis from Referral: M25.552 (ICD-10-CM) - Left hip pain   Evaluation Date: 2/16/2023  Authorization Period Expiration: 2/6/2024  Plan of Care Expiration: 3/31/2023  Progress Note Due: 3/17/2023  Visit # / Visits authorized: 2/40       Precautions: Standard     PTA Visit #: 2/5     Time In: 1300  Time Out: 1345  Total Billable Time: 45 minutes    SUBJECTIVE     Pt reports: that he has been completing his HEP and is feeling stronger with decreased symptoms between treatment sessions.  Patient reports that she is very motivated to return to [facility] and to be able to run again.  Patient reports that her symptoms are improving and is having decreased pain between treatment session.    He was compliant with home exercise program.  Response to previous treatment: no adverse reactions  Functional change: improved acitivities of daily living     Pain: 1/10  Location: bilateral back , feet , lower legs, and upper legs     OBJECTIVE     Objective Measures updated at progress report unless specified.     Treatment     Robyn(She, her, them) received the treatments listed below:      therapeutic exercises to develop strength, endurance, posture, and core stabilization for 30 minutes including:    LTR 2min   Seated upright posture 1min 4x  Seated hip flexion 2 x 10   Bridges 2 x 10   Bilateral LAQ 2-3sec 3x10  Supine Transabdominal bracing 5sec 15x  TRANSVERSE ABDOMINUS marches 2min   Supine hip adduction isometric 5 sec holds 2min  Supine clamshells GTB 3 x 10   Side lying clamshells 2 x 10   Supine Bilateral  SKTC 10sec 5x  Seated rows with GTB 2 x 10  Seated horizontal abduction GTB 2 x 10       manual therapy techniques: Joint mobilizations, Soft tissue Mobilization, and Friction Massage were applied to the: low back and LE for 00 minutes, including:      Not completed at today's treatment session      neuromuscular re-education activities to improve: Balance, Coordination, Proprioception, and Posture for 10 minutes. The following activities were included:    NuStep L2 Seat 18 UE/LE 10min    therapeutic activities to improve functional performance for 00  minutes, including:    Not completed at today's treatment session      gait training to improve functional mobility and safety for 00  minutes, including:      Not completed at today's treatment session        Patient Education and Home Exercises     Home Exercises Provided and Patient Education Provided     Education provided:     Not completed at today's treatment session      Written Home Exercises Provided: Patient instructed to cont prior HEP. Exercises were reviewed and Robyn(She, her, them) was able to demonstrate them prior to the end of the session.  Robyn(She, her, them) demonstrated good  understanding of the education provided. See EMR under Patient Instructions for exercises provided during therapy sessions    ASSESSMENT     Good tolerance to exercise.  Patient able to maintaining well current intensity for exercise.  Patient was able to progress well with intensity for NuStep for improved reciprocal gait pattern and upright posture.        Robyn(She, her, them) Is progressing well towards her goals.   Pt prognosis is Fair.     Pt will continue to benefit from skilled outpatient physical therapy to address the deficits listed in the problem list box on initial evaluation, provide pt/family education and to maximize pt's level of independence in the home and community environment.     Pt's spiritual, cultural and educational needs considered and pt  agreeable to plan of care and goals.     Anticipated barriers to physical therapy: TBD    Goals:     1. Pt will improve SIT TO STAND to independent with use of hands from standard chair and w/c for improved transitional movements and decrease risk for falls  2. Pt will improve impaired LE MMTs by 1/2 grade  to improve strength for functional tasks  3. Pt improve impaired hip ROM by 15 deg AROM in all planes to improve mobility for normal movement patterns.   Long Term Goals (6 Weeks):  1. Pt will improve FOTO score to </= 50% limited to decrease perceived limitation with mobility  2. Pt will ambulate with least restrictive ASSISTIVE DEVICE 75 feet with STAND BY ASSISTANCE for navigation within home safely  3. Pt will improve impaired LE MMTs by 1 grade to improve strength for functional tasks.  4. Pt will be compliant with HEP to supplement PT in restoring pain free function.    PLAN     Progress as tolerated per Plan of Care      Gautam Gomez, PTA

## 2023-03-17 ENCOUNTER — PATIENT MESSAGE (OUTPATIENT)
Dept: PRIMARY CARE CLINIC | Facility: CLINIC | Age: 28
End: 2023-03-17
Payer: MEDICAID

## 2023-03-21 ENCOUNTER — CLINICAL SUPPORT (OUTPATIENT)
Dept: REHABILITATION | Facility: HOSPITAL | Age: 28
End: 2023-03-21
Payer: MEDICAID

## 2023-03-21 DIAGNOSIS — M25.661 DECREASED RANGE OF MOTION OF BOTH LOWER EXTREMITIES: Primary | ICD-10-CM

## 2023-03-21 DIAGNOSIS — R26.2 DIFFICULTY WALKING: ICD-10-CM

## 2023-03-21 DIAGNOSIS — M25.662 DECREASED RANGE OF MOTION OF BOTH LOWER EXTREMITIES: Primary | ICD-10-CM

## 2023-03-21 DIAGNOSIS — R68.89 DECREASED STRENGTH, ENDURANCE, AND MOBILITY: ICD-10-CM

## 2023-03-21 DIAGNOSIS — R53.1 DECREASED STRENGTH, ENDURANCE, AND MOBILITY: ICD-10-CM

## 2023-03-21 DIAGNOSIS — Z74.09 DECREASED STRENGTH, ENDURANCE, AND MOBILITY: ICD-10-CM

## 2023-03-21 PROCEDURE — 97112 NEUROMUSCULAR REEDUCATION: CPT | Mod: PN

## 2023-03-21 PROCEDURE — 97110 THERAPEUTIC EXERCISES: CPT | Mod: PN

## 2023-03-21 NOTE — PROGRESS NOTES
[facility]   Physical Therapy Treatment Note     Name: Robyn Clifford  Clinic Number: [id number]    Therapy Diagnosis:   Encounter Diagnoses   Name Primary?    Decreased range of motion of both lower extremities Yes    Decreased strength, endurance, and mobility     Difficulty walking          Physician: Pete Vogt MD    Visit Date: 3/21/2023    Physician Orders: PT Eval and Treat   Medical Diagnosis from Referral: M25.552 (ICD-10-CM) - Left hip pain   Evaluation Date: 2/16/2023  Authorization Period Expiration: 2/6/2024  Plan of Care Expiration: 3/31/2023  Progress Note Due: 3/17/2023  Visit # / Visits authorized: 7/40       Precautions: Standard     PTA Visit #: 0/5     Time In: 1:15 pm  Time Out: 1:45 pm  Total Billable Time: 30 minutes    SUBJECTIVE     Pt reports: started taking Baclofen every night which seems to be helping overall. Patient reports that his hip and back have been feeling better each day. Patient reports she arrived late due to transportation issues.     He was compliant with home exercise program.  Response to previous treatment: no adverse reactions  Functional change: improved acitivities of daily living     Pain: 0/10  Location: bilateral back , feet , lower legs, and upper legs     OBJECTIVE     Objective Measures updated at progress report unless specified.     Treatment     Robyn(She, her, them) received the treatments listed below:      therapeutic exercises to develop strength, endurance, posture, and core stabilization for 15 minutes including:    LTR 2min   Seated upright posture 1min 4x  Seated hip flexion 2 x 10   Bridges 2 x 10   Bilateral LAQ 2-3sec 3x10  Supine Transabdominal bracing 5sec 15x  TRANSVERSE ABDOMINUS marches 2min   STRAIGHT LEG RAISE 1 x 5 (ACTIVE ASSISTED RANGE OF MOTION with RIGHT LOWER EXTREMITY)  Standing squats 1 x 10 HHA at counter   Supine hip adduction isometric 5 sec holds 2min  Supine clamshells GTB 3 x 10   Side lying  alysonhells 2 x 10   Supine Bilateral SKTC 10sec 5x  Seated rows with GTB 2 x 10  Seated horizontal abduction GTB 2 x 10       manual therapy techniques: Joint mobilizations, Soft tissue Mobilization, and Friction Massage were applied to the: low back and LE for 00 minutes, including:      Not completed at today's treatment session      neuromuscular re-education activities to improve: Balance, Coordination, Proprioception, and Posture for 15 minutes. The following activities were included:    NuStep L2 Seat 18 UE/LE 15min    therapeutic activities to improve functional performance for 00  minutes, including:    Not completed at today's treatment session      gait training to improve functional mobility and safety for 00  minutes, including:      Not completed at today's treatment session        Patient Education and Home Exercises     Home Exercises Provided and Patient Education Provided     Education provided:     Not completed at today's treatment session      Written Home Exercises Provided: Patient instructed to cont prior HEP. Exercises were reviewed and Robyn(She, her, them) was able to demonstrate them prior to the end of the session.  Robyn(She, her, them) demonstrated good  understanding of the education provided. See EMR under Patient Instructions for exercises provided during therapy sessions    ASSESSMENT     Good tolerance to exercise.  Patient able to maintaining well current intensity for exercise.  Patient was able to progress well with intensity for NuStep for improved reciprocal gait pattern and upright posture. Patient arrived lat to session today, therefore, truncated treatment at 30 minutes.         Robyn(She, her, them) Is progressing well towards her goals.   Pt prognosis is Fair.     Pt will continue to benefit from skilled outpatient physical therapy to address the deficits listed in the problem list box on initial evaluation, provide pt/family education and to maximize pt's level of  independence in the home and community environment.     Pt's spiritual, cultural and educational needs considered and pt agreeable to plan of care and goals.     Anticipated barriers to physical therapy: TBD    Goals:     1. Pt will improve SIT TO STAND to independent with use of hands from standard chair and w/c for improved transitional movements and decrease risk for falls  2. Pt will improve impaired LE MMTs by 1/2 grade  to improve strength for functional tasks  3. Pt improve impaired hip ROM by 15 deg AROM in all planes to improve mobility for normal movement patterns.   Long Term Goals (6 Weeks):  1. Pt will improve FOTO score to </= 50% limited to decrease perceived limitation with mobility  2. Pt will ambulate with least restrictive ASSISTIVE DEVICE 75 feet with STAND BY ASSISTANCE for navigation within home safely  3. Pt will improve impaired LE MMTs by 1 grade to improve strength for functional tasks.  4. Pt will be compliant with HEP to supplement PT in restoring pain free function.    PLAN     Progress as tolerated per Plan of Care      Alejandrina Caballero PT

## 2023-03-23 ENCOUNTER — CLINICAL SUPPORT (OUTPATIENT)
Dept: REHABILITATION | Facility: HOSPITAL | Age: 28
End: 2023-03-23
Payer: MEDICAID

## 2023-03-23 DIAGNOSIS — Z74.09 DECREASED STRENGTH, ENDURANCE, AND MOBILITY: ICD-10-CM

## 2023-03-23 DIAGNOSIS — M25.661 DECREASED RANGE OF MOTION OF BOTH LOWER EXTREMITIES: Primary | ICD-10-CM

## 2023-03-23 DIAGNOSIS — R68.89 DECREASED STRENGTH, ENDURANCE, AND MOBILITY: ICD-10-CM

## 2023-03-23 DIAGNOSIS — M25.662 DECREASED RANGE OF MOTION OF BOTH LOWER EXTREMITIES: Primary | ICD-10-CM

## 2023-03-23 DIAGNOSIS — R53.1 DECREASED STRENGTH, ENDURANCE, AND MOBILITY: ICD-10-CM

## 2023-03-23 DIAGNOSIS — R26.2 DIFFICULTY WALKING: ICD-10-CM

## 2023-03-23 PROCEDURE — 97112 NEUROMUSCULAR REEDUCATION: CPT | Mod: PN

## 2023-03-23 PROCEDURE — 97110 THERAPEUTIC EXERCISES: CPT | Mod: PN

## 2023-03-23 NOTE — PLAN OF CARE
[facility]   Physical Therapy Treatment Note     Name: Robyn Clifford  [facility] Number: [id number]    Therapy Diagnosis:   Encounter Diagnoses   Name Primary?    Decreased range of motion of both lower extremities Yes    Decreased strength, endurance, and mobility     Difficulty walking          Physician: Pete Vogt MD    Visit Date: 3/23/2023    Physician Orders: PT Eval and Treat   Medical Diagnosis from Referral: M25.552 (ICD-10-CM) - Left hip pain   Evaluation Date: 2/16/2023  Authorization Period Expiration: 2/6/2024  Plan of Care Expiration: 5/4/2023  Progress Note Due: 4/20/2023  Visit # / Visits authorized: 7/40       Precautions: Standard     PTA Visit #: 0/5     Time In: 1:15 pm  Time Out: 1:45 pm  Total Billable Time: 30 minutes    SUBJECTIVE     Pt reports: received a new wheelchair, tried popping a wheelie and fell backwards and hit her head. Patient reports that she told her doctor but after a while she was feeling better. Patient reports she did not have a headache or any other symptom. Patient reports no dizziness, lightheadedness, double vision, etc. Patient reports that her back and hip feel much better in the new wheelchair. Patient reports she still gets discomfort in back and hip when she stands up and tries doing light walker. Patient reports she is not using a walker but she will be getting a cane soon. Patient reports she is keeping up with some of the exercises.     He was compliant with home exercise program.  Response to previous treatment: no adverse reactions  Functional change: improved acitivities of daily living     Pain: 0/10  Location: bilateral back , feet , lower legs, and upper legs     OBJECTIVE     Objective Measures updated at progress report unless specified.   SIT TO STAND: requires minmal assistance, unable to stand without hand support, significant decreased WEIGHT BEARING (reports increased pain in bilateral legs and lumbar spine with standing  posture)   AMBULATION: 2 HHA required, significant difficulty with forward propulsion due to decreased hip flexion, foot clearance  Flexibility: increased spasticity in bilateral hamstrings and foot posturing     Hip Range of Motion:    Right active Right Passive Left active  Left Passive   Flexion 90 120 110 pain 120    Abduction 30 WNL 30  WNL   Extension -10 0 -10 0   Ext. Rotation 10 45 40  45   Int. Rotation 10 45 35 45         Lower Extremity Strength  Right LE   Left LE     Quadriceps: 4/5 Quadriceps: 4/5   Hamstrings: 4-/5 Hamstrings: 4-/5   Iliopsoas: 3/5 Iliopsoas: 3/5   Hip extension:  3/5 Hip extension: 3/5   PGM: 3/5 PGM: 3/5   Hip ER: 3/5 Hip ER: 3/5   Hip IR: 3/5 Hip IR: 3/5   Glut Max 3/5    Glut Max 3/5          Treatment     Gaelen(She, her, them) received the treatments listed below:      therapeutic exercises to develop strength, endurance, posture, and core stabilization for 30 minutes including:  Reassessment   LTR 2min   Seated upright posture 1min 4x  Seated hip flexion 2 x 10   Bridges 2 x 10   Seated clamshells RTB 2 x 10   Standing marches 2 HHA 2 x 10 (active assist range of motion)  Bilateral LAQ 2-3sec 3x10  Supine Transabdominal bracing 5sec 15x  TRANSVERSE ABDOMINUS marches 2min   STRAIGHT LEG RAISE 1 x 5 (ACTIVE ASSISTED RANGE OF MOTION with RIGHT LOWER EXTREMITY)  Standing mini squats 1 x 10 HHA at counter   Hook lying hip adduction isometric 5 sec holds 2min  Supine clamshells GTB 3 x 10   Side lying clamshells 2 x 10   Supine Bilateral SKTC 10sec 5x  Seated rows with GTB 2 x 10  Seated horizontal abduction GTB 2 x 10       manual therapy techniques: Joint mobilizations, Soft tissue Mobilization, and Friction Massage were applied to the: low back and LE for 00 minutes, including:      Not completed at today's treatment session      neuromuscular re-education activities to improve: Balance, Coordination, Proprioception, and Posture for 15 minutes. The following activities were  included:    NuStep L2 Seat 18 UE/LE 15min    therapeutic activities to improve functional performance for 00  minutes, including:    Not completed at today's treatment session      gait training to improve functional mobility and safety for 00  minutes, including:      Not completed at today's treatment session        Patient Education and Home Exercises     Home Exercises Provided and Patient Education Provided     Education provided:     Not completed at today's treatment session      Written Home Exercises Provided: Patient instructed to cont prior HEP. Exercises were reviewed and Robyn(She, her, them) was able to demonstrate them prior to the end of the session.  Robyn(She, her, them) demonstrated good  understanding of the education provided. See EMR under Patient Instructions for exercises provided during therapy sessions    ASSESSMENT     Patient presents to therapy for RA following 5 weeks of therapy, only 8 sessions performed at this time. Patient demonstrates no significant improvements in BLE strength, however, has improved with passive range of motion with significant decreased hip and low back pain during most daily activities. Patient reports continued low back pain with upright standing and short distance ambulation. Patient has progressed to performing light standing exercises for increased load tolerance, upright posture, and weight bearing on BLE to work towards increased walking ability within short-distances. Patient will continue to benefit from skilled physical therapy services at this time, therefore, extension of services for 6 more weeks to continue improving functional mobility.         Robyn(She, her, them) Is progressing well towards her goals.   Pt prognosis is Fair.     Pt will continue to benefit from skilled outpatient physical therapy to address the deficits listed in the problem list box on initial evaluation, provide pt/family education and to maximize pt's level of  independence in the home and community environment.     Pt's spiritual, cultural and educational needs considered and pt agreeable to plan of care and goals.     Anticipated barriers to physical therapy: TBD    Goals:     1. Pt will improve SIT TO STAND to independent with use of hands from standard chair and w/c for improved transitional movements and decrease risk for falls (progressing, not met)  2. Pt will improve impaired LE MMTs by 1/2 grade  to improve strength for functional tasks (not met)  3. Pt improve impaired hip ROM by 15 deg AROM in all planes to improve mobility for normal movement patterns. (not met)  Long Term Goals (6 Weeks):  1. Pt will improve FOTO score to </= 50% limited to decrease perceived limitation with mobility (progressing, not met)  2. Pt will ambulate with least restrictive ASSISTIVE DEVICE 75 feet with STAND BY ASSISTANCE for navigation within home safely (not met)  3. Pt will improve impaired LE MMTs by 1 grade to improve strength for functional tasks. (Not met)  4. Pt will be compliant with HEP to supplement PT in restoring pain free function. (progressing, not met)    PLAN   Plan of care Certification: 3/23/2023 to 5/4/2023     Outpatient Physical Therapy 2 times weekly for 6 weeks to include the following interventions: Gait Training, Manual Therapy, Neuromuscular Re-ed, Patient Education, Self Care, Therapeutic Activities, and Therapeutic Exercise.     Progress as tolerated per Plan of Care      Alejandrina Caballero PT

## 2023-03-30 ENCOUNTER — PATIENT OUTREACH (OUTPATIENT)
Dept: ADMINISTRATIVE | Facility: OTHER | Age: 28
End: 2023-03-30

## 2023-03-30 ENCOUNTER — CLINICAL SUPPORT (OUTPATIENT)
Dept: REHABILITATION | Facility: OTHER | Age: 28
End: 2023-03-30
Payer: MEDICAID

## 2023-03-30 ENCOUNTER — PATIENT MESSAGE (OUTPATIENT)
Dept: PRIMARY CARE CLINIC | Facility: CLINIC | Age: 28
End: 2023-03-30
Payer: MEDICAID

## 2023-03-30 ENCOUNTER — PATIENT MESSAGE (OUTPATIENT)
Dept: ADMINISTRATIVE | Facility: OTHER | Age: 28
End: 2023-03-30
Payer: MEDICAID

## 2023-03-30 DIAGNOSIS — M25.661 DECREASED RANGE OF MOTION OF BOTH LOWER EXTREMITIES: Primary | ICD-10-CM

## 2023-03-30 DIAGNOSIS — R26.2 DIFFICULTY WALKING: ICD-10-CM

## 2023-03-30 DIAGNOSIS — Z60.9 POOR SOCIAL SITUATION: Primary | ICD-10-CM

## 2023-03-30 DIAGNOSIS — M25.662 DECREASED RANGE OF MOTION OF BOTH LOWER EXTREMITIES: Primary | ICD-10-CM

## 2023-03-30 DIAGNOSIS — R68.89 DECREASED STRENGTH, ENDURANCE, AND MOBILITY: ICD-10-CM

## 2023-03-30 DIAGNOSIS — R53.1 DECREASED STRENGTH, ENDURANCE, AND MOBILITY: ICD-10-CM

## 2023-03-30 DIAGNOSIS — Z74.09 DECREASED STRENGTH, ENDURANCE, AND MOBILITY: ICD-10-CM

## 2023-03-30 PROCEDURE — 97530 THERAPEUTIC ACTIVITIES: CPT | Mod: PN

## 2023-03-30 PROCEDURE — 97110 THERAPEUTIC EXERCISES: CPT | Mod: PN

## 2023-03-30 SDOH — SOCIAL DETERMINANTS OF HEALTH (SDOH): PROBLEM RELATED TO SOCIAL ENVIRONMENT, UNSPECIFIED: Z60.9

## 2023-03-30 NOTE — PROGRESS NOTES
[facility]   Physical Therapy Treatment Note     Name: Robyn Clifford  Clinic Number: [id number]    Therapy Diagnosis:   Encounter Diagnoses   Name Primary?    Decreased range of motion of both lower extremities Yes    Decreased strength, endurance, and mobility     Difficulty walking          Physician: Pete Vogt MD    Visit Date: 3/30/2023    Physician Orders: PT Eval and Treat   Medical Diagnosis from Referral: M25.552 (ICD-10-CM) - Left hip pain   Evaluation Date: 2/16/2023  Authorization Period Expiration: 2/6/2024  Plan of Care Expiration: 3/31/2023  Progress Note Due: 3/17/2023  Visit # / Visits authorized: 7/40       Precautions: Standard     PTA Visit #: 0/5     Time In: 1:30 pm  Time Out: 2:20 pm  Total Billable Time: 50 minutes    SUBJECTIVE     Pt reports: pain has subsided a lot, continues to have back pain with standing and upright postures     He was compliant with home exercise program.  Response to previous treatment: no adverse reactions  Functional change: improved acitivities of daily living     Pain: 0/10  Location: bilateral back , feet , lower legs, and upper legs     OBJECTIVE     Objective Measures updated at progress report unless specified.     Treatment     Robyn(She, her, them) received the treatments listed below:      therapeutic exercises to develop strength, endurance, posture, and core stabilization for 35 minutes including:  Attempted recumbent and upright bike- unable to perform due to   LTR 2min   Seated upright posture 1min 4x  Seated hip flexion 2 x 10   Bridges 2 x 10   Bilateral LAQ 2-3sec 3x10  Supine Transabdominal bracing 5sec 15x  TRANSVERSE ABDOMINUS marches 2min   STRAIGHT LEG RAISE 1 x 5 (ACTIVE ASSISTED RANGE OF MOTION with RIGHT LOWER EXTREMITY)  Standing squats 1 x 10 HHA parallel bars  Supine hip adduction isometric 5 sec holds 2min  Supine clamshells GTB 3 x 10   Seated paloff press GTB 2 x 10   Seated rotations GTB 2 x 10   Side  "lying clamshells 2 x 10   Supine Bilateral SKTC 10sec 5x  Seated rows with GTB 2 x 10  Seated horizontal abduction GTB 2 x 10       manual therapy techniques: Joint mobilizations, Soft tissue Mobilization, and Friction Massage were applied to the: low back and LE for 00 minutes, including:      Not completed at today's treatment session      neuromuscular re-education activities to improve: Balance, Coordination, Proprioception, and Posture for 15 minutes. The following activities were included:    NuStep L2 Seat 18 UE/LE 15min    therapeutic activities to improve functional performance for 15  minutes, including:  In parallel bars:   Standing hip flexion (ACTIVE ASSISTED RANGE OF MOTION) 2 x 10   Forward mini lunges 1 x 10   Lateral mini lunges 1 x 10  Mini Squats 2 x 10   Step taps 4" 1 x 10 ACTIVE ASSISTED RANGE OF MOTION with LLE        gait training to improve functional mobility and safety for 00  minutes, including:      Not completed at today's treatment session        Patient Education and Home Exercises     Home Exercises Provided and Patient Education Provided     Education provided:     Not completed at today's treatment session      Written Home Exercises Provided: Patient instructed to cont prior HEP. Exercises were reviewed and Robyn(She, her, them) was able to demonstrate them prior to the end of the session.  Robyn(She, her, them) demonstrated good  understanding of the education provided. See EMR under Patient Instructions for exercises provided during therapy sessions    ASSESSMENT     Good tolerance to exercise.  Patient able to maintaining well current intensity for exercise.  Unable to perform stationary or upright bike due to increased spasticity and decreased BLE leg strength. Patient demonstrates significant decreased core control and stabilization with seated core exercises performed today, however, able to perform exercises with verbal cueing. Increased standing strengthening working on " increased WEIGHT BEARING, weight shifting, and BLE coordination. Patient requires significant use of UE throughout due to decreased BLE strength.       Robyn(She, her, them) Is progressing well towards her goals.   Pt prognosis is Fair.     Pt will continue to benefit from skilled outpatient physical therapy to address the deficits listed in the problem list box on initial evaluation, provide pt/family education and to maximize pt's level of independence in the home and community environment.     Pt's spiritual, cultural and educational needs considered and pt agreeable to plan of care and goals.     Anticipated barriers to physical therapy: TBD    Goals:     1. Pt will improve SIT TO STAND to independent with use of hands from standard chair and w/c for improved transitional movements and decrease risk for falls (progressing, not met)  2. Pt will improve impaired LE MMTs by 1/2 grade  to improve strength for functional tasks (progressing, not met)  3. Pt improve impaired hip ROM by 15 deg AROM in all planes to improve mobility for normal movement patterns. (progressing, not met)   Long Term Goals (6 Weeks):  1. Pt will improve FOTO score to </= 50% limited to decrease perceived limitation with mobility (progressing, not met)  2. Pt will ambulate with least restrictive ASSISTIVE DEVICE 75 feet with STAND BY ASSISTANCE for navigation within home safely (progressing, not met)  3. Pt will improve impaired LE MMTs by 1 grade to improve strength for functional tasks. (progressing, not met)   4. Pt will be compliant with HEP to supplement PT in restoring pain free function.(progressing, not met)    PLAN     Progress as tolerated per Plan of Care      Alejandrina Caballero, PT

## 2023-03-30 NOTE — TELEPHONE ENCOUNTER
Please advise  Alert and oriented to person, place, time/situation. normal mood and affect. no apparent risk to self or others.

## 2023-04-04 ENCOUNTER — CLINICAL SUPPORT (OUTPATIENT)
Dept: REHABILITATION | Facility: OTHER | Age: 28
End: 2023-04-04
Payer: MEDICAID

## 2023-04-04 DIAGNOSIS — R68.89 DECREASED STRENGTH, ENDURANCE, AND MOBILITY: ICD-10-CM

## 2023-04-04 DIAGNOSIS — R26.2 DIFFICULTY WALKING: ICD-10-CM

## 2023-04-04 DIAGNOSIS — M25.661 DECREASED RANGE OF MOTION OF BOTH LOWER EXTREMITIES: Primary | ICD-10-CM

## 2023-04-04 DIAGNOSIS — R53.1 DECREASED STRENGTH, ENDURANCE, AND MOBILITY: ICD-10-CM

## 2023-04-04 DIAGNOSIS — Z74.09 DECREASED STRENGTH, ENDURANCE, AND MOBILITY: ICD-10-CM

## 2023-04-04 DIAGNOSIS — M25.662 DECREASED RANGE OF MOTION OF BOTH LOWER EXTREMITIES: Primary | ICD-10-CM

## 2023-04-04 PROCEDURE — 97110 THERAPEUTIC EXERCISES: CPT | Mod: PN

## 2023-04-04 PROCEDURE — 97530 THERAPEUTIC ACTIVITIES: CPT | Mod: PN

## 2023-04-04 NOTE — PROGRESS NOTES
[facility]   Physical Therapy Treatment Note     Name: Robyn Clifford  Clinic Number: [id number]    Therapy Diagnosis:   Encounter Diagnoses   Name Primary?    Decreased range of motion of both lower extremities Yes    Decreased strength, endurance, and mobility     Difficulty walking          Physician: Pete Vogt MD    Visit Date: 4/4/2023    Physician Orders: PT Eval and Treat   Medical Diagnosis from Referral: M25.552 (ICD-10-CM) - Left hip pain   Evaluation Date: 2/16/2023  Authorization Period Expiration: 2/6/2024  Plan of Care Expiration: 3/31/2023  Progress Note Due: 3/17/2023  Visit # / Visits authorized: 7/40       Precautions: Standard     PTA Visit #: 0/5     Time In: 1:30 pm  Time Out: 2:15 pm  Total Billable Time: 45 minutes    SUBJECTIVE     Pt reports: pain has subsided a lot, continues to have back pain with standing and upright postures.     He was compliant with home exercise program.  Response to previous treatment: no adverse reactions  Functional change: improved acitivities of daily living     Pain: 0/10  Location: bilateral back , feet , lower legs, and upper legs     OBJECTIVE     Objective Measures updated at progress report unless specified.     Treatment     Robyn(She, her, them) received the treatments listed below:      therapeutic exercises to develop strength, endurance, posture, and core stabilization for 30 minutes including:  Attempted recumbent and upright bike- unable to perform due to   LTR 2min   Seated upright posture 1min 4x  Seated hip flexion 2 x 10   Bridges 2 x 10   Bilateral LAQ 2-3sec 3x10  Supine Transabdominal bracing 5sec 15x  TRANSVERSE ABDOMINUS marches 2min   STRAIGHT LEG RAISE 1 x 5 (ACTIVE ASSISTED RANGE OF MOTION with RIGHT LOWER EXTREMITY)  Standing squats 1 x 10 HHA parallel bars  Supine hip adduction isometric 5 sec holds 2min  Supine clamshells GTB 3 x 10   Seated paloff press GTB 2 x 10   Seated rotations GTB 2 x 10  "  Resisted LTR pink band 2 x 10   Standing stability ball press downs 5 sec 2 x 10 (Forwards and lateral)  Side lying clamshells 2 x 10   Supine Bilateral SKTC 10sec 5x  Seated rows with GTB 2 x 10  Seated horizontal abduction GTB 2 x 10       manual therapy techniques: Joint mobilizations, Soft tissue Mobilization, and Friction Massage were applied to the: low back and LE for 00 minutes, including:      Not completed at today's treatment session      neuromuscular re-education activities to improve: Balance, Coordination, Proprioception, and Posture for 00 minutes. The following activities were included:    NuStep L2 Seat 18 UE/LE 15min    therapeutic activities to improve functional performance for 15  minutes, including:  In parallel bars:   Standing hip flexion (ACTIVE ASSISTED RANGE OF MOTION) 2 x 10   Forward mini lunges 1 x 10   Lateral mini lunges 1 x 10  Mini Squats 2 x 10   Step taps 4" 1 x 10 ACTIVE ASSISTED RANGE OF MOTION with LLE        gait training to improve functional mobility and safety for 00  minutes, including:      Not completed at today's treatment session        Patient Education and Home Exercises     Home Exercises Provided and Patient Education Provided     Education provided:     Not completed at today's treatment session      Written Home Exercises Provided: Patient instructed to cont prior HEP. Exercises were reviewed and Robyn(She, her, them) was able to demonstrate them prior to the end of the session.  Robyn(She, her, them) demonstrated good  understanding of the education provided. See EMR under Patient Instructions for exercises provided during therapy sessions    ASSESSMENT     Good tolerance to exercise.  Patient able to maintaining current intensity for exercise. Performed increased standing core stabilization requiring CONTACT GUARD ASSIST from therapist due to safety and decreased core stability. Patient had significant difficulty with resisted rotational movements (LTR or " seated trunk rotations) with significant verbal cues for upright posture and form throughout. Patient has minimal movement each direction requiring significant decreased resistance from band. Increased standing strengthening working on increased WEIGHT BEARING, weight shifting, and BLE coordination. Patient requires significant use of UE throughout due to decreased BLE strength.       Robyn(She, her, them) Is progressing well towards her goals.   Pt prognosis is Fair.     Pt will continue to benefit from skilled outpatient physical therapy to address the deficits listed in the problem list box on initial evaluation, provide pt/family education and to maximize pt's level of independence in the home and community environment.     Pt's spiritual, cultural and educational needs considered and pt agreeable to plan of care and goals.     Anticipated barriers to physical therapy: TBD    Goals:     1. Pt will improve SIT TO STAND to independent with use of hands from standard chair and w/c for improved transitional movements and decrease risk for falls (progressing, not met)  2. Pt will improve impaired LE MMTs by 1/2 grade  to improve strength for functional tasks (progressing, not met)  3. Pt improve impaired hip ROM by 15 deg AROM in all planes to improve mobility for normal movement patterns. (progressing, not met)   Long Term Goals (6 Weeks):  1. Pt will improve FOTO score to </= 50% limited to decrease perceived limitation with mobility (progressing, not met)  2. Pt will ambulate with least restrictive ASSISTIVE DEVICE 75 feet with STAND BY ASSISTANCE for navigation within home safely (progressing, not met)  3. Pt will improve impaired LE MMTs by 1 grade to improve strength for functional tasks. (progressing, not met)   4. Pt will be compliant with HEP to supplement PT in restoring pain free function.(progressing, not met)    PLAN     Progress as tolerated per Plan of Care      Alejandrina Caballero, PT

## 2023-04-06 ENCOUNTER — CLINICAL SUPPORT (OUTPATIENT)
Dept: REHABILITATION | Facility: OTHER | Age: 28
End: 2023-04-06
Payer: MEDICAID

## 2023-04-06 DIAGNOSIS — R26.2 DIFFICULTY WALKING: ICD-10-CM

## 2023-04-06 DIAGNOSIS — Z74.09 DECREASED STRENGTH, ENDURANCE, AND MOBILITY: ICD-10-CM

## 2023-04-06 DIAGNOSIS — M25.662 DECREASED RANGE OF MOTION OF BOTH LOWER EXTREMITIES: Primary | ICD-10-CM

## 2023-04-06 DIAGNOSIS — M25.661 DECREASED RANGE OF MOTION OF BOTH LOWER EXTREMITIES: Primary | ICD-10-CM

## 2023-04-06 DIAGNOSIS — R53.1 DECREASED STRENGTH, ENDURANCE, AND MOBILITY: ICD-10-CM

## 2023-04-06 DIAGNOSIS — R68.89 DECREASED STRENGTH, ENDURANCE, AND MOBILITY: ICD-10-CM

## 2023-04-06 PROCEDURE — 97530 THERAPEUTIC ACTIVITIES: CPT | Mod: PN

## 2023-04-06 PROCEDURE — 97110 THERAPEUTIC EXERCISES: CPT | Mod: PN

## 2023-04-06 NOTE — PROGRESS NOTES
[facility]   Physical Therapy Treatment Note     Name: Robyn Clifford  Clinic Number: [id number]    Therapy Diagnosis:   Encounter Diagnoses   Name Primary?    Decreased range of motion of both lower extremities Yes    Decreased strength, endurance, and mobility     Difficulty walking          Physician: Pete Vogt MD    Visit Date: 4/6/2023    Physician Orders: PT Eval and Treat   Medical Diagnosis from Referral: M25.552 (ICD-10-CM) - Left hip pain   Evaluation Date: 2/16/2023  Authorization Period Expiration: 2/6/2024  Plan of Care Expiration: 3/31/2023  Progress Note Due: 3/17/2023  Visit # / Visits authorized: 10/40       Precautions: Standard     PTA Visit #: 0/5     Time In: 1:30 pm  Time Out: 2:15 pm  Total Billable Time: 45 minutes    SUBJECTIVE     Pt reports: patient reports no new changes overall, pain continues to be less.     He was compliant with home exercise program.  Response to previous treatment: no adverse reactions  Functional change: improved acitivities of daily living     Pain: 0/10  Location: bilateral back , feet , lower legs, and upper legs     OBJECTIVE     Objective Measures updated at progress report unless specified.     Treatment     Robyn(She, her, them) received the treatments listed below:      therapeutic exercises to develop strength, endurance, posture, and core stabilization for 30 minutes including:  Attempted recumbent and upright bike- unable to perform due to   LTR 2min   Seated upright posture 1min 4x  Seated hip flexion 2 x 10   Bridges 2 x 10   DKTC with STABILITY BALL 2 x 10   Bilateral LAQ 2-3sec 3x10  Supine Transabdominal bracing 5sec 15x  TRANSVERSE ABDOMINUS marches 2min   STRAIGHT LEG RAISE 1 x 5 (ACTIVE ASSISTED RANGE OF MOTION with RIGHT LOWER EXTREMITY)  Standing squats 1 x 10 HHA parallel bars  Supine hip adduction isometric 5 sec holds 2min  Supine clamshells GTB 3 x 10   Seated paloff press GTB 2 x 10   Seated rotations GTB 2 x  "10   Seated resisted back extension pink band x20 bilateral 1 Hand support   Resisted LTR pink band 2 x 10   Standing stability ball press downs 5 sec 2 x 10 (Forwards and lateral)  Side lying clamshells 2 x 10   Supine Bilateral SKTC 10sec 5x  Seated rows with GTB 2 x 10  Seated horizontal abduction GTB 2 x 10       manual therapy techniques: Joint mobilizations, Soft tissue Mobilization, and Friction Massage were applied to the: low back and LE for 00 minutes, including:      Not completed at today's treatment session      neuromuscular re-education activities to improve: Balance, Coordination, Proprioception, and Posture for 00 minutes. The following activities were included:    NuStep L2 Seat 18 UE/LE 15min    therapeutic activities to improve functional performance for 15  minutes, including:  In parallel bars:   Standing hip flexion (ACTIVE ASSISTED RANGE OF MOTION) 2 x 10   Forward mini lunges 1 x 10   Lateral mini lunges 1 x 10  Mini Squats 2 x 10   Step taps 4" 1 x 10 ACTIVE ASSISTED RANGE OF MOTION with LLE        gait training to improve functional mobility and safety for 00  minutes, including:      Not completed at today's treatment session        Patient Education and Home Exercises     Home Exercises Provided and Patient Education Provided     Education provided:     Not completed at today's treatment session      Written Home Exercises Provided: Patient instructed to cont prior HEP. Exercises were reviewed and Robyn(She, her, them) was able to demonstrate them prior to the end of the session.  Robyn(She, her, them) demonstrated good  understanding of the education provided. See EMR under Patient Instructions for exercises provided during therapy sessions    ASSESSMENT     Good tolerance to exercise. Performed increased standing core stabilization requiring CONTACT GUARD ASSIST from therapist due to safety and decreased core stability (press downs into stability ball). Patient had significant " difficulty with resisted rotational movements (LTR or seated trunk rotations) with significant verbal cues for upright posture and form throughout due to arm compensations. Increased standing strengthening working on increased WEIGHT BEARING, weight shifting, and BLE coordination. Performed standing rows with 1 hand held support for improved standing tolerance and core/back musculature activation. Patient requires significant use of UE throughout standing exercises due to decreased BLE strength.       Robyn(She, her, them) Is progressing well towards her goals.   Pt prognosis is Fair.     Pt will continue to benefit from skilled outpatient physical therapy to address the deficits listed in the problem list box on initial evaluation, provide pt/family education and to maximize pt's level of independence in the home and community environment.     Pt's spiritual, cultural and educational needs considered and pt agreeable to plan of care and goals.     Anticipated barriers to physical therapy: TBD    Goals:     1. Pt will improve SIT TO STAND to independent with use of hands from standard chair and w/c for improved transitional movements and decrease risk for falls (progressing, not met)  2. Pt will improve impaired LE MMTs by 1/2 grade  to improve strength for functional tasks (progressing, not met)  3. Pt improve impaired hip ROM by 15 deg AROM in all planes to improve mobility for normal movement patterns. (progressing, not met)   Long Term Goals (6 Weeks):  1. Pt will improve FOTO score to </= 50% limited to decrease perceived limitation with mobility (progressing, not met)  2. Pt will ambulate with least restrictive ASSISTIVE DEVICE 75 feet with STAND BY ASSISTANCE for navigation within home safely (progressing, not met)  3. Pt will improve impaired LE MMTs by 1 grade to improve strength for functional tasks. (progressing, not met)   4. Pt will be compliant with HEP to supplement PT in restoring pain free  function.(progressing, not met)    PLAN     Progress as tolerated per Plan of Care      Alejandrina Caballero, PT

## 2023-04-07 ENCOUNTER — PATIENT MESSAGE (OUTPATIENT)
Dept: PRIMARY CARE CLINIC | Facility: CLINIC | Age: 28
End: 2023-04-07
Payer: MEDICAID

## 2023-04-11 ENCOUNTER — CLINICAL SUPPORT (OUTPATIENT)
Dept: REHABILITATION | Facility: OTHER | Age: 28
End: 2023-04-11
Payer: MEDICAID

## 2023-04-11 DIAGNOSIS — R26.2 DIFFICULTY WALKING: ICD-10-CM

## 2023-04-11 DIAGNOSIS — R68.89 DECREASED STRENGTH, ENDURANCE, AND MOBILITY: ICD-10-CM

## 2023-04-11 DIAGNOSIS — M25.662 DECREASED RANGE OF MOTION OF BOTH LOWER EXTREMITIES: Primary | ICD-10-CM

## 2023-04-11 DIAGNOSIS — R53.1 DECREASED STRENGTH, ENDURANCE, AND MOBILITY: ICD-10-CM

## 2023-04-11 DIAGNOSIS — Z74.09 DECREASED STRENGTH, ENDURANCE, AND MOBILITY: ICD-10-CM

## 2023-04-11 DIAGNOSIS — M25.661 DECREASED RANGE OF MOTION OF BOTH LOWER EXTREMITIES: Primary | ICD-10-CM

## 2023-04-11 PROCEDURE — 97530 THERAPEUTIC ACTIVITIES: CPT | Mod: PN

## 2023-04-11 PROCEDURE — 97110 THERAPEUTIC EXERCISES: CPT | Mod: PN

## 2023-04-11 NOTE — PROGRESS NOTES
[facility]   Physical Therapy Treatment Note     Name: Robyn Clifford  Clinic Number: [id number]    Therapy Diagnosis:   Encounter Diagnoses   Name Primary?    Decreased range of motion of both lower extremities Yes    Decreased strength, endurance, and mobility     Difficulty walking          Physician: Pete Vogt MD    Visit Date: 4/11/2023    Physician Orders: PT Eval and Treat   Medical Diagnosis from Referral: M25.552 (ICD-10-CM) - Left hip pain   Evaluation Date: 2/16/2023  Authorization Period Expiration: 2/6/2024  Plan of Care Expiration: 3/31/2023  Progress Note Due: 3/17/2023  Visit # / Visits authorized: 10/40       Precautions: Standard     PTA Visit #: 0/5     Time In: 1:45 pm  Time Out: 2:15 pm  Total Billable Time: 30 minutes    SUBJECTIVE     Pt reports: patient reports no new changes overall, pain continues to be less.     He was compliant with home exercise program.  Response to previous treatment: no adverse reactions  Functional change: improved acitivities of daily living     Pain: 0/10  Location: bilateral back , feet , lower legs, and upper legs     OBJECTIVE     Objective Measures updated at progress report unless specified.     Treatment     Robyn(She, her, them) received the treatments listed below:      therapeutic exercises to develop strength, endurance, posture, and core stabilization for 30 minutes including:  Attempted recumbent and upright bike- unable to perform due to   LTR 2min   Seated upright posture 1min 4x  Seated hip flexion 2 x 10   Bridges 2 x 10   DKTC with STABILITY BALL 2 x 10   Bilateral LAQ 2-3sec 3x10  Supine Transabdominal bracing 5sec 15x  TRANSVERSE ABDOMINUS marches 2min   STRAIGHT LEG RAISE 1 x 5 (ACTIVE ASSISTED RANGE OF MOTION with RIGHT LOWER EXTREMITY)  Standing squats 1 x 10 HHA parallel bars  Supine hip adduction isometric 5 sec holds 2min  Supine clamshells GTB 3 x 10   Seated paloff press GTB 2 x 10   Seated rotations GTB 2  "x 10   Seated resisted back extension pink band x20 bilateral 1 Hand support   Resisted LTR pink band 2 x 10   Standing stability ball press downs 5 sec 2 x 10 (Forwards and lateral)  Side lying clamshells 2 x 10   Supine Bilateral SKTC 10sec 5x  Seated rows with GTB 2 x 10  Seated horizontal abduction GTB 2 x 10       manual therapy techniques: Joint mobilizations, Soft tissue Mobilization, and Friction Massage were applied to the: low back and LE for 00 minutes, including:      Not completed at today's treatment session      neuromuscular re-education activities to improve: Balance, Coordination, Proprioception, and Posture for 00 minutes. The following activities were included:    NuStep L2 Seat 18 UE/LE 15min    therapeutic activities to improve functional performance for 15  minutes, including:  In parallel bars:   Standing hip flexion (ACTIVE ASSISTED RANGE OF MOTION) 2 x 10   Forward mini lunges 1 x 10   Lateral mini lunges 1 x 10  Mini Squats 2 x 10   Standing heel raises 2 x 10   Lateral steps in parallel bar 6 laps   Step taps 4" 1 x 10 ACTIVE ASSISTED RANGE OF MOTION with LLE        gait training to improve functional mobility and safety for 00  minutes, including:      Not completed at today's treatment session        Patient Education and Home Exercises     Home Exercises Provided and Patient Education Provided     Education provided:     Not completed at today's treatment session      Written Home Exercises Provided: Patient instructed to cont prior HEP. Exercises were reviewed and Robyn(She, her, them) was able to demonstrate them prior to the end of the session.  Robyn(She, her, them) demonstrated good  understanding of the education provided. See EMR under Patient Instructions for exercises provided during therapy sessions    ASSESSMENT     Increased standing strengthening exercises for improved standing tolerance, weight bearing of BLE, and improved strength/balance. Patient performed standing " marches/step taps with decreased assistance required from therapist. Patient demonstrates overall improved standing tolerance with decreased seated rest breaks throughout session. Session truncated at 30 minutes due to late arrival to therapy session.       Robyn(She, her, them) Is progressing well towards her goals.   Pt prognosis is Fair.     Pt will continue to benefit from skilled outpatient physical therapy to address the deficits listed in the problem list box on initial evaluation, provide pt/family education and to maximize pt's level of independence in the home and community environment.     Pt's spiritual, cultural and educational needs considered and pt agreeable to plan of care and goals.     Anticipated barriers to physical therapy: TBD    Goals:     1. Pt will improve SIT TO STAND to independent with use of hands from standard chair and w/c for improved transitional movements and decrease risk for falls (progressing, not met)  2. Pt will improve impaired LE MMTs by 1/2 grade  to improve strength for functional tasks (progressing, not met)  3. Pt improve impaired hip ROM by 15 deg AROM in all planes to improve mobility for normal movement patterns. (progressing, not met)   Long Term Goals (6 Weeks):  1. Pt will improve FOTO score to </= 50% limited to decrease perceived limitation with mobility (progressing, not met)  2. Pt will ambulate with least restrictive ASSISTIVE DEVICE 75 feet with STAND BY ASSISTANCE for navigation within home safely (progressing, not met)  3. Pt will improve impaired LE MMTs by 1 grade to improve strength for functional tasks. (progressing, not met)   4. Pt will be compliant with HEP to supplement PT in restoring pain free function.(progressing, not met)    PLAN     Progress as tolerated per Plan of Care      Alejandrina Caballero, PT

## 2023-04-18 ENCOUNTER — CLINICAL SUPPORT (OUTPATIENT)
Dept: REHABILITATION | Facility: OTHER | Age: 28
End: 2023-04-18
Payer: MEDICAID

## 2023-04-18 DIAGNOSIS — R26.2 DIFFICULTY WALKING: ICD-10-CM

## 2023-04-18 DIAGNOSIS — Z74.09 DECREASED STRENGTH, ENDURANCE, AND MOBILITY: ICD-10-CM

## 2023-04-18 DIAGNOSIS — M25.662 DECREASED RANGE OF MOTION OF BOTH LOWER EXTREMITIES: Primary | ICD-10-CM

## 2023-04-18 DIAGNOSIS — R68.89 DECREASED STRENGTH, ENDURANCE, AND MOBILITY: ICD-10-CM

## 2023-04-18 DIAGNOSIS — M25.661 DECREASED RANGE OF MOTION OF BOTH LOWER EXTREMITIES: Primary | ICD-10-CM

## 2023-04-18 DIAGNOSIS — R53.1 DECREASED STRENGTH, ENDURANCE, AND MOBILITY: ICD-10-CM

## 2023-04-18 PROCEDURE — 97110 THERAPEUTIC EXERCISES: CPT | Mod: PN

## 2023-04-18 PROCEDURE — 97530 THERAPEUTIC ACTIVITIES: CPT | Mod: PN

## 2023-04-18 NOTE — PROGRESS NOTES
[facility]   Physical Therapy Treatment Note     Name: Robyn Clifford  Clinic Number: [id number]    Therapy Diagnosis:   Encounter Diagnoses   Name Primary?    Decreased range of motion of both lower extremities Yes    Decreased strength, endurance, and mobility     Difficulty walking          Physician: Pete Vogt MD    Visit Date: 4/18/2023    Physician Orders: PT Eval and Treat   Medical Diagnosis from Referral: M25.552 (ICD-10-CM) - Left hip pain   Evaluation Date: 2/16/2023  Authorization Period Expiration: 2/6/2024  Plan of Care Expiration: 3/31/2023  Progress Note Due: 3/17/2023  Visit # / Visits authorized: 14/40       Precautions: Standard     PTA Visit #: 0/5     Time In: 1:20 pm  Time Out: 2:10 pm  Total Billable Time: 50 minutes    SUBJECTIVE     Pt reports: patient reports no new changes overall, pain continues to be less. Patient reports increased ease getting around at home.     He was compliant with home exercise program.  Response to previous treatment: no adverse reactions  Functional change: improved acitivities of daily living     Pain: 0/10  Location: bilateral back , feet , lower legs, and upper legs     OBJECTIVE     Objective Measures updated at progress report unless specified.     Treatment     Robyn(She, her, them) received the treatments listed below:      therapeutic exercises to develop strength, endurance, posture, and core stabilization for 30 minutes including:  Attempted recumbent and upright bike- unable to perform due to   LTR 2min   Seated upright posture 1min 4x  Seated hip flexion 2 x 10   Bridges 2 x 10   DKTC with STABILITY BALL 2 x 10   Bilateral LAQ 2-3sec 3x10  Supine Transabdominal bracing 5sec 15x  TRANSVERSE ABDOMINUS marches 2min   STRAIGHT LEG RAISE 1 x 5 (ACTIVE ASSISTED RANGE OF MOTION with RIGHT LOWER EXTREMITY)  Standing squats 1 x 10 HHA parallel bars  Seated postural stabilization exercises, reaches forwards, lateral diagonal,  "rotational 2 x 10 ea   Supine hip adduction isometric 5 sec holds 2min  Supine clamshells GTB 3 x 10   Seated paloff press GTB 2 x 10   Seated rotations GTB 2 x 10   Seated resisted back extension pink band x20 bilateral 1 Hand support   Resisted LTR pink band 2 x 10   Standing stability ball press downs 5 sec 2 x 10 (Forwards and lateral)  Side lying clamshells 2 x 10   Supine Bilateral SKTC 10sec 5x  Seated rows with GTB 2 x 10  Seated horizontal abduction GTB 2 x 10       manual therapy techniques: Joint mobilizations, Soft tissue Mobilization, and Friction Massage were applied to the: low back and LE for 00 minutes, including:      Not completed at today's treatment session      neuromuscular re-education activities to improve: Balance, Coordination, Proprioception, and Posture for 00 minutes. The following activities were included:    NuStep L2 Seat 18 UE/LE 15min    therapeutic activities to improve functional performance for 20  minutes, including:  In parallel bars:   Standing hip flexion (ACTIVE ASSISTED RANGE OF MOTION) 2 x 10   Forward mini lunges 1 x 10   Lateral mini lunges 1 x 10  Mini Squats 2 x 10   Standing heel raises 2 x 10   Lateral steps in parallel bar 6 laps   Step taps 4" 1 x 10 ACTIVE ASSISTED RANGE OF MOTION with LLE        gait training to improve functional mobility and safety for 00  minutes, including:      Not completed at today's treatment session        Patient Education and Home Exercises     Home Exercises Provided and Patient Education Provided     Education provided:     Not completed at today's treatment session      Written Home Exercises Provided: Patient instructed to cont prior HEP. Exercises were reviewed and Robyn(She, her, them) was able to demonstrate them prior to the end of the session.  Robyn(She, her, them) demonstrated good  understanding of the education provided. See EMR under Patient Instructions for exercises provided during therapy sessions    ASSESSMENT "   Patient had significant difficulty with lateral and rotational seated postural exercises, LOB x 2 ea direction, decreased reaching range of motion to perform with decreased LOB. Patient reports no increase in pain throughout session. Tolerated treatment well with improved standing tolerance to 10 minutes of standing exercises with no seated rest break. Patient will be discharged next session due to significant decreased pain, improved functional mobility, and maximized therapy progress at this time. Patient is also relocating and would benefit from skilled neurological rehabilitation.     Robyn(She, her, them) Is progressing well towards her goals.   Pt prognosis is Fair.     Pt will continue to benefit from skilled outpatient physical therapy to address the deficits listed in the problem list box on initial evaluation, provide pt/family education and to maximize pt's level of independence in the home and community environment.     Pt's spiritual, cultural and educational needs considered and pt agreeable to plan of care and goals.     Anticipated barriers to physical therapy: TBD    Goals:     1. Pt will improve SIT TO STAND to independent with use of hands from standard chair and w/c for improved transitional movements and decrease risk for falls (progressing, not met)  2. Pt will improve impaired LE MMTs by 1/2 grade  to improve strength for functional tasks (progressing, not met)  3. Pt improve impaired hip ROM by 15 deg AROM in all planes to improve mobility for normal movement patterns. (progressing, not met)   Long Term Goals (6 Weeks):  1. Pt will improve FOTO score to </= 50% limited to decrease perceived limitation with mobility (progressing, not met)  2. Pt will ambulate with least restrictive ASSISTIVE DEVICE 75 feet with STAND BY ASSISTANCE for navigation within home safely (progressing, not met)  3. Pt will improve impaired LE MMTs by 1 grade to improve strength for functional tasks. (progressing,  not met)   4. Pt will be compliant with HEP to supplement PT in restoring pain free function.(progressing, not met)    PLAN     Progress as tolerated per Plan of Care      Alejandrina Caballero, PT

## 2023-04-20 ENCOUNTER — CLINICAL SUPPORT (OUTPATIENT)
Dept: REHABILITATION | Facility: OTHER | Age: 28
End: 2023-04-20
Payer: MEDICAID

## 2023-04-20 DIAGNOSIS — R53.1 DECREASED STRENGTH, ENDURANCE, AND MOBILITY: ICD-10-CM

## 2023-04-20 DIAGNOSIS — M25.661 DECREASED RANGE OF MOTION OF BOTH LOWER EXTREMITIES: Primary | ICD-10-CM

## 2023-04-20 DIAGNOSIS — Z74.09 DECREASED STRENGTH, ENDURANCE, AND MOBILITY: ICD-10-CM

## 2023-04-20 DIAGNOSIS — R68.89 DECREASED STRENGTH, ENDURANCE, AND MOBILITY: ICD-10-CM

## 2023-04-20 DIAGNOSIS — M25.662 DECREASED RANGE OF MOTION OF BOTH LOWER EXTREMITIES: Primary | ICD-10-CM

## 2023-04-20 DIAGNOSIS — R26.2 DIFFICULTY WALKING: ICD-10-CM

## 2023-04-20 PROCEDURE — 97530 THERAPEUTIC ACTIVITIES: CPT | Mod: PN

## 2023-04-20 NOTE — PROGRESS NOTES
[facility]   Outpatient Therapy Discharge Summary       Name: Robyn Clifford  Clinic Number: [id number]    Therapy Diagnosis:   Encounter Diagnoses   Name Primary?    Decreased range of motion of both lower extremities Yes    Decreased strength, endurance, and mobility     Difficulty walking        Physician: Pete Vogt MD    Visit Date: 4/20/2023    Physician Orders: PT Eval and Treat   Medical Diagnosis from Referral: M25.552 (ICD-10-CM) - Left hip pain   Evaluation Date: 2/16/2023  Authorization Period Expiration: 2/6/2024  Plan of Care Expiration: 3/31/2023  Progress Note Due: 3/17/2023  Visit # / Visits authorized: 15/40       Precautions: Standard     PTA Visit #: 0/5     Time In: 1:15 pm  Time Out: 1:35 pm  Total Billable Time: 20 minutes    SUBJECTIVE     Pt reports: patient reports no new changes overall, pain continues to be less. Patient reports increased ease getting around at home. Patient reports she is moving and will not be able to continue with therapy.     He was compliant with home exercise program.  Response to previous treatment: no adverse reactions  Functional change: improved acitivities of daily living     Pain: 0/10  Location: bilateral back , feet , lower legs, and upper legs     OBJECTIVE     Objective Measures updated at progress report unless specified.     Treatment     Robyn(She, her, them) received the treatments listed below:      therapeutic exercises to develop strength, endurance, posture, and core stabilization for 00 minutes including:  Attempted recumbent and upright bike- unable to perform due to   LTR 2min   Seated upright posture 1min 4x  Seated hip flexion 2 x 10   Bridges 2 x 10   DKTC with STABILITY BALL 2 x 10   Bilateral LAQ 2-3sec 3x10  Supine Transabdominal bracing 5sec 15x  TRANSVERSE ABDOMINUS marches 2min   STRAIGHT LEG RAISE 1 x 5 (ACTIVE ASSISTED RANGE OF MOTION with RIGHT LOWER EXTREMITY)  Standing squats 1 x 10 HHA parallel  "bars  Seated postural stabilization exercises, reaches forwards, lateral diagonal, rotational 2 x 10 ea   Supine hip adduction isometric 5 sec holds 2min  Supine clamshells GTB 3 x 10   Seated paloff press GTB 2 x 10   Seated rotations GTB 2 x 10   Seated resisted back extension pink band x20 bilateral 1 Hand support   Resisted LTR pink band 2 x 10   Standing stability ball press downs 5 sec 2 x 10 (Forwards and lateral)  Side lying clamshells 2 x 10   Supine Bilateral SKTC 10sec 5x  Seated rows with GTB 2 x 10  Seated horizontal abduction GTB 2 x 10       manual therapy techniques: Joint mobilizations, Soft tissue Mobilization, and Friction Massage were applied to the: low back and LE for 00 minutes, including:      Not completed at today's treatment session      neuromuscular re-education activities to improve: Balance, Coordination, Proprioception, and Posture for 00 minutes. The following activities were included:    NuStep L2 Seat 18 UE/LE 15min    therapeutic activities to improve functional performance for 20 minutes, including:  In parallel bars:   Standing hip flexion (ACTIVE ASSISTED RANGE OF MOTION) 2 x 10   Forward mini lunges 1 x 10   Lateral mini lunges 1 x 10  Mini Squats 2 x 10   Standing heel raises 2 x 10   Lateral steps in parallel bar 6 laps   Step taps 4" 1 x 10 ACTIVE ASSISTED RANGE OF MOTION with LLE        gait training to improve functional mobility and safety for 00  minutes, including:      Not completed at today's treatment session        Patient Education and Home Exercises     Home Exercises Provided and Patient Education Provided     Education provided:     Not completed at today's treatment session      Written Home Exercises Provided: Patient instructed to cont prior HEP. Exercises were reviewed and Robyn(She, her, them) was able to demonstrate them prior to the end of the session.  Robyn(She, her, them) demonstrated good  understanding of the education provided. See EMR under " Patient Instructions for exercises provided during therapy sessions    ASSESSMENT     Discharge reason: Patient has reached the maximum rehab potential for the present time    1. Pt will improve SIT TO STAND to independent with use of hands from standard chair and w/c for improved transitional movements and decrease risk for falls (progressing, not met)  2. Pt will improve impaired LE MMTs by 1/2 grade  to improve strength for functional tasks (progressing, not met)  3. Pt improve impaired hip ROM by 15 deg AROM in all planes to improve mobility for normal movement patterns. (progressing, not met)   Long Term Goals (6 Weeks):  1. Pt will improve FOTO score to </= 50% limited to decrease perceived limitation with mobility (progressing, not met)  2. Pt will ambulate with least restrictive ASSISTIVE DEVICE 75 feet with STAND BY ASSISTANCE for navigation within home safely (progressing, not met)  3. Pt will improve impaired LE MMTs by 1 grade to improve strength for functional tasks. (progressing, not met)   4. Pt will be compliant with HEP to supplement PT in restoring pain free function.(progressing, not met)    Plan   This patient is discharged from Physical Therapy at this time.    Alejandrina Caballero, PT

## 2023-05-17 NOTE — PROGRESS NOTES
CHW - Outreach Attempt    Community Health Worker left a voicemail message for 3rd attempt to contact patient regarding: follow up for resources for housing   Community Health Worker to attempt to contact patient on: 5/17/23

## 2023-06-06 NOTE — PROGRESS NOTES
CHW - Follow Up    This Community Health Worker completed a follow up visit with patient via telephone today.  Pt/Caregiver reported: pt reported he is no longer living in [place], she is living with her parents in [place], [place] and is looking for a place to stay in [place], pt admitting herself to [facility] and is receiving mental health services  Community Health Worker provided: CHW will continue to assist pt with finding affording housing  Follow up required: yes   Follow-up Outreach - Due: 6/12/2023

## 2025-03-11 DIAGNOSIS — M35.2 BEHCET'S SYNDROME: Primary | ICD-10-CM
